# Patient Record
Sex: FEMALE | Race: ASIAN | NOT HISPANIC OR LATINO | Employment: OTHER | URBAN - METROPOLITAN AREA
[De-identification: names, ages, dates, MRNs, and addresses within clinical notes are randomized per-mention and may not be internally consistent; named-entity substitution may affect disease eponyms.]

---

## 2018-04-05 ENCOUNTER — OFFICE VISIT (OUTPATIENT)
Dept: INTERNAL MEDICINE | Facility: CLINIC | Age: 68
End: 2018-04-05

## 2018-04-05 VITALS
TEMPERATURE: 98 F | WEIGHT: 117 LBS | SYSTOLIC BLOOD PRESSURE: 126 MMHG | HEART RATE: 71 BPM | HEIGHT: 64 IN | BODY MASS INDEX: 19.97 KG/M2 | RESPIRATION RATE: 16 BRPM | OXYGEN SATURATION: 98 % | DIASTOLIC BLOOD PRESSURE: 84 MMHG

## 2018-04-05 DIAGNOSIS — E55.9 AVITAMINOSIS D: ICD-10-CM

## 2018-04-05 DIAGNOSIS — E78.2 MIXED HYPERLIPIDEMIA: ICD-10-CM

## 2018-04-05 DIAGNOSIS — K63.5 POLYP OF COLON, UNSPECIFIED PART OF COLON, UNSPECIFIED TYPE: ICD-10-CM

## 2018-04-05 DIAGNOSIS — Z12.31 ENCOUNTER FOR SCREENING MAMMOGRAM FOR MALIGNANT NEOPLASM OF BREAST: Primary | ICD-10-CM

## 2018-04-05 DIAGNOSIS — R17 SERUM TOTAL BILIRUBIN ELEVATED: ICD-10-CM

## 2018-04-05 PROCEDURE — 99214 OFFICE O/P EST MOD 30 MIN: CPT | Performed by: FAMILY MEDICINE

## 2018-04-05 NOTE — PROGRESS NOTES
Subjective   Kyaw Webb is a 67 y.o. female.     Chief Complaint   Patient presents with   • New patient         History of Present Illness   Delightful lady is any by her  she is a new patient with minimal medical history and on no medicines.  She had labs with borderline elevation of cholesterol and triglycerides also total bilirubin of 1.4.  There is a history of low vitamin D.  She has had a prior benign polyp colon polyp removed in 2014 I Dr. Bueno.  It looks like she would probably need repeat colonoscopy in 2019.  We discussed getting scheduled for mammograms.      The following portions of the patient's history were reviewed and updated as appropriate: allergies, current medications, past social history and problem list.    Review of Systems   Constitutional: Negative.    HENT: Negative.    Eyes: Negative.    Respiratory: Negative.    Cardiovascular: Negative.    Gastrointestinal: Negative.    Endocrine: Negative.    Genitourinary: Negative.    Skin: Negative.    Allergic/Immunologic: Negative.    Neurological: Negative.    Hematological: Negative.    Psychiatric/Behavioral: Negative.        Objective   Vitals:    04/05/18 1204   BP: 126/84   Pulse: 71   Resp: 16   Temp: 98 °F (36.7 °C)   SpO2: 98%     Physical Exam   Constitutional: She is oriented to person, place, and time. She appears well-developed and well-nourished.   HENT:   Head: Normocephalic.   Right Ear: External ear normal.   Left Ear: External ear normal.   Nose: Nose normal.   Mouth/Throat: Oropharynx is clear and moist.   Eyes: Conjunctivae and EOM are normal. Pupils are equal, round, and reactive to light.   Neck: Normal range of motion. Neck supple. No thyromegaly present.   Cardiovascular: Normal rate, regular rhythm and normal heart sounds.    Pulmonary/Chest: Effort normal and breath sounds normal.   Abdominal: Soft. Bowel sounds are normal.   Musculoskeletal: Normal range of motion.   Lymphadenopathy:     She has no  cervical adenopathy.   Neurological: She is alert and oriented to person, place, and time. She has normal reflexes.   Skin: Skin is warm and dry.   Psychiatric: She has a normal mood and affect. Her behavior is normal. Judgment and thought content normal.   Nursing note and vitals reviewed.      Assessment/Plan   Problem List Items Addressed This Visit        Digestive    Avitaminosis D    Relevant Orders    CBC & Differential    Comprehensive Metabolic Panel    Lipid Panel With / Chol / HDL Ratio    Urinalysis With / Microscopic If Indicated - Urine, Clean Catch      Other Visit Diagnoses     Encounter for screening mammogram for malignant neoplasm of breast    -  Primary    Relevant Orders    Mammo Screening Bilateral With CAD    Mixed hyperlipidemia        Relevant Orders    CBC & Differential    Comprehensive Metabolic Panel    Lipid Panel With / Chol / HDL Ratio    Urinalysis With / Microscopic If Indicated - Urine, Clean Catch    Polyp of colon, unspecified part of colon, unspecified type        Relevant Orders    CBC & Differential    Comprehensive Metabolic Panel    Lipid Panel With / Chol / HDL Ratio    Urinalysis With / Microscopic If Indicated - Urine, Clean Catch    Serum total bilirubin elevated        Relevant Orders    CBC & Differential    Comprehensive Metabolic Panel    Lipid Panel With / Chol / HDL Ratio    Urinalysis With / Microscopic If Indicated - Urine, Clean Catch      Patient is healthy.  We'll schedule mammograms and see her back in here for wellness exam show get screening labs today.

## 2018-04-06 LAB
ALBUMIN SERPL-MCNC: 4.9 G/DL (ref 3.5–5.2)
ALBUMIN/GLOB SERPL: 1.8 G/DL
ALP SERPL-CCNC: 93 U/L (ref 39–117)
ALT SERPL-CCNC: 26 U/L (ref 1–33)
APPEARANCE UR: CLEAR
AST SERPL-CCNC: 30 U/L (ref 1–32)
BACTERIA #/AREA URNS HPF: ABNORMAL /HPF
BASOPHILS # BLD AUTO: 0.02 10*3/MM3 (ref 0–0.2)
BASOPHILS NFR BLD AUTO: 0.4 % (ref 0–1.5)
BILIRUB SERPL-MCNC: 1.9 MG/DL (ref 0.1–1.2)
BILIRUB UR QL STRIP: NEGATIVE
BUN SERPL-MCNC: 13 MG/DL (ref 8–23)
BUN/CREAT SERPL: 15.7 (ref 7–25)
CALCIUM SERPL-MCNC: 10 MG/DL (ref 8.6–10.5)
CHLORIDE SERPL-SCNC: 101 MMOL/L (ref 98–107)
CHOLEST SERPL-MCNC: 250 MG/DL (ref 0–200)
CHOLEST/HDLC SERPL: 3.62 {RATIO}
CO2 SERPL-SCNC: 28.2 MMOL/L (ref 22–29)
COLOR UR: YELLOW
CREAT SERPL-MCNC: 0.83 MG/DL (ref 0.57–1)
EOSINOPHIL # BLD AUTO: 0.03 10*3/MM3 (ref 0–0.7)
EOSINOPHIL NFR BLD AUTO: 0.6 % (ref 0.3–6.2)
EPI CELLS #/AREA URNS HPF: ABNORMAL /HPF
ERYTHROCYTE [DISTWIDTH] IN BLOOD BY AUTOMATED COUNT: 12.2 % (ref 11.7–13)
GFR SERPLBLD CREATININE-BSD FMLA CKD-EPI: 68 ML/MIN/1.73
GFR SERPLBLD CREATININE-BSD FMLA CKD-EPI: 83 ML/MIN/1.73
GLOBULIN SER CALC-MCNC: 2.8 GM/DL
GLUCOSE SERPL-MCNC: 93 MG/DL (ref 65–99)
GLUCOSE UR QL: NEGATIVE
HCT VFR BLD AUTO: 42.8 % (ref 35.6–45.5)
HDLC SERPL-MCNC: 69 MG/DL (ref 40–60)
HGB BLD-MCNC: 13.8 G/DL (ref 11.9–15.5)
HGB UR QL STRIP: ABNORMAL
IMM GRANULOCYTES # BLD: 0 10*3/MM3 (ref 0–0.03)
IMM GRANULOCYTES NFR BLD: 0 % (ref 0–0.5)
KETONES UR QL STRIP: NEGATIVE
LDLC SERPL CALC-MCNC: 148 MG/DL (ref 0–100)
LEUKOCYTE ESTERASE UR QL STRIP: NEGATIVE
LYMPHOCYTES # BLD AUTO: 1.35 10*3/MM3 (ref 0.9–4.8)
LYMPHOCYTES NFR BLD AUTO: 26.9 % (ref 19.6–45.3)
MCH RBC QN AUTO: 31.7 PG (ref 26.9–32)
MCHC RBC AUTO-ENTMCNC: 32.2 G/DL (ref 32.4–36.3)
MCV RBC AUTO: 98.2 FL (ref 80.5–98.2)
MONOCYTES # BLD AUTO: 0.25 10*3/MM3 (ref 0.2–1.2)
MONOCYTES NFR BLD AUTO: 5 % (ref 5–12)
NEUTROPHILS # BLD AUTO: 3.37 10*3/MM3 (ref 1.9–8.1)
NEUTROPHILS NFR BLD AUTO: 67.1 % (ref 42.7–76)
NITRITE UR QL STRIP: NEGATIVE
PH UR STRIP: 6.5 [PH] (ref 5–8)
PLATELET # BLD AUTO: 180 10*3/MM3 (ref 140–500)
POTASSIUM SERPL-SCNC: 4.6 MMOL/L (ref 3.5–5.2)
PROT SERPL-MCNC: 7.7 G/DL (ref 6–8.5)
PROT UR QL STRIP: NEGATIVE
RBC # BLD AUTO: 4.36 10*6/MM3 (ref 3.9–5.2)
RBC #/AREA URNS HPF: ABNORMAL /HPF
SODIUM SERPL-SCNC: 143 MMOL/L (ref 136–145)
SP GR UR: ABNORMAL (ref 1–1.03)
TRIGL SERPL-MCNC: 164 MG/DL (ref 0–150)
UROBILINOGEN UR STRIP-MCNC: ABNORMAL MG/DL
VLDLC SERPL CALC-MCNC: 32.8 MG/DL (ref 5–40)
WBC # BLD AUTO: 5.02 10*3/MM3 (ref 4.5–10.7)
WBC #/AREA URNS HPF: ABNORMAL /HPF

## 2018-04-19 ENCOUNTER — HOSPITAL ENCOUNTER (OUTPATIENT)
Dept: MAMMOGRAPHY | Facility: HOSPITAL | Age: 68
Discharge: HOME | End: 2018-04-19
Admitting: FAMILY MEDICINE

## 2018-04-19 PROCEDURE — 77067 SCR MAMMO BI INCL CAD: CPT

## 2019-04-11 ENCOUNTER — OFFICE VISIT (OUTPATIENT)
Dept: INTERNAL MEDICINE | Facility: CLINIC | Age: 69
End: 2019-04-11

## 2019-04-11 VITALS
HEART RATE: 77 BPM | DIASTOLIC BLOOD PRESSURE: 78 MMHG | TEMPERATURE: 97 F | BODY MASS INDEX: 19.74 KG/M2 | WEIGHT: 115 LBS | SYSTOLIC BLOOD PRESSURE: 114 MMHG | OXYGEN SATURATION: 98 %

## 2019-04-11 DIAGNOSIS — R01.1 SYSTOLIC MURMUR: Primary | ICD-10-CM

## 2019-04-11 DIAGNOSIS — E78.2 MIXED HYPERLIPIDEMIA: ICD-10-CM

## 2019-04-11 DIAGNOSIS — E55.9 AVITAMINOSIS D: ICD-10-CM

## 2019-04-11 PROCEDURE — 99214 OFFICE O/P EST MOD 30 MIN: CPT | Performed by: FAMILY MEDICINE

## 2019-04-11 PROCEDURE — G0439 PPPS, SUBSEQ VISIT: HCPCS | Performed by: FAMILY MEDICINE

## 2019-04-11 PROCEDURE — 93000 ELECTROCARDIOGRAM COMPLETE: CPT | Performed by: FAMILY MEDICINE

## 2019-04-11 NOTE — PROGRESS NOTES
Procedure     ECG 12 Lead  Date/Time: 4/11/2019 11:25 AM  Performed by: Jhoan Burnett Jr., MD  Authorized by: Jhoan Burnett Jr., MD   Comparison: not compared with previous ECG   Previous ECG: no previous ECG available  Rhythm: sinus rhythm  Rhythm comments: Sinus arrhythmia  Rate: normal  Conduction: conduction normal  ST Segments: ST segments normal  T Waves: T waves normal  QRS axis: normal  Other: no other findings    Clinical impression: normal ECG

## 2019-04-11 NOTE — PATIENT INSTRUCTIONS
Medicare Wellness  Personal Prevention Plan of Service     Date of Office Visit:  2019  Encounter Provider:  Jhoan Burnett Jr., MD  Place of Service:  [facility] INTERNAL MEDICINE  Patient Name: Kyaw Webb  :  1950    As part of the Medicare Wellness portion of your visit today, we are providing you with this personalized preventive plan of services (PPPS). This plan is based upon recommendations of the [place] Preventive Services Task Force (USPSTF) and the Advisory Committee on Immunization Practices (ACIP).    This lists the preventive care services that should be considered, and provides dates of when you are due. Items listed as completed are up-to-date and do not require any further intervention.    Health Maintenance   Topic Date Due   • TDAP/TD VACCINES (1 - Tdap) 10/13/1968   • ZOSTER VACCINE (1 of 2) 10/13/1999   • HEPATITIS C SCREENING  2016   • MEDICARE ANNUAL WELLNESS  2016   • LIPID PANEL  2019   • PNEUMOCOCCAL VACCINES (65+ LOW/MEDIUM RISK) (1 of 2 - PCV13) 2020 (Originally 10/13/2014)   • INFLUENZA VACCINE  2019   • MAMMOGRAM  2020   • COLONOSCOPY  2024       No orders of the defined types were placed in this encounter.      No Follow-up on file.

## 2019-04-11 NOTE — PROGRESS NOTES
Subsequent Medicare Wellness Visit   The ABC's of the Annual Wellness Visit    No chief complaint on file.      HPI:  Kyaw Webb, -1950, is a 68 y.o. female who presents for a Subsequent Medicare Wellness Visit.    Recent Hospitalizations:  No hospitalization(s) within the last year..    Current Medical Providers:  Patient Care Team:  Jhoan Burnett Jr., MD as PCP - General (Family Medicine)  Zay Roque MD PhD as PCP - Claims Attributed    Health Habits and Functional and Cognitive Screening and Depression Screening:  Functional & Cognitive Status 2019   Do you have difficulty preparing food and eating? No   Do you have difficulty bathing yourself, getting dressed or grooming yourself? No   Do you have difficulty using the toilet? No   Do you have difficulty moving around from place to place? No   Do you have trouble with steps or getting out of a bed or a chair? No   In the past year have you fallen or experienced a near fall? No   Current Diet Well Balanced Diet   Dental Exam Up to date   Eye Exam Up to date   Exercise (times per week) 5 times per week   Current Exercise Activities Include Walking   Do you need help using the phone?  No   Are you deaf or do you have serious difficulty hearing?  No   Do you need help with transportation? No   Do you need help shopping? No   Do you need help preparing meals?  No   Do you need help with housework?  No   Do you need help with laundry? No   Do you need help taking your medications? No   Do you need help managing money? No   Do you ever drive or ride in a car without wearing a seat belt? No   Have you felt unusual stress, anger or loneliness in the last month? No   Who do you live with? Spouse   If you need help, do you have trouble finding someone available to you? No   Have you been bothered in the last four weeks by sexual problems? No   Do you have difficulty concentrating, remembering or making decisions? No       Compared to one  year ago, the patient feels her physical health is the same and her mental health is the same.    Depression Screen:  PHQ-2/PHQ-9 Depression Screening 4/11/2019   Little interest or pleasure in doing things 0   Feeling down, depressed, or hopeless 0   Trouble falling or staying asleep, or sleeping too much 0   Feeling tired or having little energy 0   Poor appetite or overeating 0   Feeling bad about yourself - or that you are a failure or have let yourself or your family down 0   Trouble concentrating on things, such as reading the newspaper or watching television 0   Moving or speaking so slowly that other people could have noticed. Or the opposite - being so fidgety or restless that you have been moving around a lot more than usual 0   Thoughts that you would be better off dead, or of hurting yourself in some way 0   Total Score 0   If you checked off any problems, how difficult have these problems made it for you to do your work, take care of things at home, or get along with other people? Not difficult at all         Past Medical/Family/Social History:  The following portions of the patient's history were reviewed and updated as appropriate: allergies, current medications, past family history, past medical history, past social history, past surgical history and problem list.    Allergies   Allergen Reactions   • Amoxicillin        No current outpatient medications on file.    Aspirin use counseling: Does not need ASA (and currently is not on it)    Current medication list contains no high risk medications.  No harmful drug interactions have been identified.     Family History   Family history unknown: Yes       Social History     Tobacco Use   • Smoking status: Never Smoker   Substance Use Topics   • Alcohol use: No       Past Surgical History:   Procedure Laterality Date   • HYSTERECTOMY     • OOPHORECTOMY         Patient Active Problem List   Diagnosis   • Avitaminosis D       Review of Systems    Objective      Vitals:    04/11/19 1032   BP: 114/78   BP Location: Left arm   Patient Position: Sitting   Cuff Size: Adult   Pulse: 77   Temp: 97 °F (36.1 °C)   TempSrc: Tympanic   SpO2: 98%   Weight: 52.2 kg (115 lb)   PainSc: 0-No pain       Patient's Body mass index is 19.74 kg/m². BMI is within normal parameters. No follow-up required..      No exam data present    The patient has no evidence of cognitve impairment.     Physical Exam    Recent Lab Results:  Lab Results   Component Value Date    GLU 93 04/05/2018     Lab Results   Component Value Date    TRIG 164 (H) 04/05/2018    HDL 69 (H) 04/05/2018    VLDL 32.8 04/05/2018       Assessment/Plan   Age-appropriate Screening Schedule:  Refer to the list below for future screening recommendations based on patient's age, sex and/or medical conditions.      Health Maintenance   Topic Date Due   • TDAP/TD VACCINES (1 - Tdap) 10/13/1968   • ZOSTER VACCINE (1 of 2) 10/13/1999   • LIPID PANEL  04/05/2019   • PNEUMOCOCCAL VACCINES (65+ LOW/MEDIUM RISK) (1 of 2 - PCV13) 04/09/2020 (Originally 10/13/2014)   • INFLUENZA VACCINE  08/01/2019   • MAMMOGRAM  04/19/2020   • COLONOSCOPY  09/11/2024       Medicare Risks and Personalized Health Plan:  Cardiovascular risk;  Patient advised to follow heart healthy diet to help decrease cardiovascular risk       CMS-Preventive Services Quick Reference  Medicare Preventive Services Addressed:  Annual Wellness Visit (AWV)    Advance Care Planning:  Patient does not have an advance directive - not interested in additional information    There are no diagnoses linked to this encounter.    An After Visit Summary and PPPS with all of these plans were given to the patient.      Follow Up:  No Follow-up on file.

## 2019-04-11 NOTE — PROGRESS NOTES
Robb Webb is a 68 y.o. female.     Chief Complaint   Patient presents with   • Hyperlipidemia   • Annual Exam         History of Present Illness   Delightful lady who is asymptomatic with a history of some degree of hyperlipidemia last year.  She would desires to skip mammogram every other year.  Otherwise on examination she is found to have an early systolic murmur that I did not hear last year.  EKG shows normal sinus rhythm sinus arrhythmia.  We will set her up for an echocardiogram and also follow-up with Dr. Berman cardiology.      The following portions of the patient's history were reviewed and updated as appropriate: allergies, current medications, past social history and problem list.    Review of Systems   Constitutional: Negative.    HENT: Negative.    Eyes: Negative.    Respiratory: Negative.    Cardiovascular: Negative.    Gastrointestinal: Negative.    Endocrine: Negative.    Genitourinary: Negative.    Musculoskeletal: Negative.    Skin: Negative.    Allergic/Immunologic: Negative.    Neurological: Negative.    Hematological: Negative.    Psychiatric/Behavioral: Negative.        Objective   Vitals:    04/11/19 1032   BP: 114/78   Pulse: 77   Temp: 97 °F (36.1 °C)   SpO2: 98%     Physical Exam   Constitutional: She is oriented to person, place, and time. She appears well-developed and well-nourished.   HENT:   Head: Normocephalic and atraumatic.   Right Ear: Tympanic membrane and external ear normal.   Left Ear: Tympanic membrane and external ear normal.   Nose: Nose normal.   Mouth/Throat: Oropharynx is clear and moist.   Eyes: Conjunctivae and EOM are normal. Pupils are equal, round, and reactive to light.   Neck: Normal range of motion. Neck supple. No JVD present. No thyromegaly present.   Cardiovascular: Normal rate, regular rhythm and intact distal pulses. Frequent extrasystoles are present.   Murmur heard.   Systolic murmur is present with a grade of  2/6.  Pulmonary/Chest: Effort normal and breath sounds normal.   Abdominal: Soft. Bowel sounds are normal.   Musculoskeletal: Normal range of motion.   Lymphadenopathy:     She has no cervical adenopathy.   Neurological: She is alert and oriented to person, place, and time. No cranial nerve deficit. Coordination normal.   Skin: Skin is warm and dry. No rash noted.   Psychiatric: She has a normal mood and affect. Her behavior is normal. Judgment and thought content normal.   Vitals reviewed.      Assessment/Plan   Problem List Items Addressed This Visit        Digestive    Avitaminosis D    Relevant Orders    CBC & Differential    Comprehensive Metabolic Panel    Lipid Panel With / Chol / HDL Ratio    TSH    T4, Free    Urinalysis With Microscopic If Indicated (No Culture) - Urine, Clean Catch    Vitamin D 25 Hydroxy      Other Visit Diagnoses     Systolic murmur    -  Primary    Relevant Orders    Adult Transthoracic Echo Complete W/ Cont if Necessary Per Protocol    CBC & Differential    Comprehensive Metabolic Panel    Lipid Panel With / Chol / HDL Ratio    TSH    T4, Free    Urinalysis With Microscopic If Indicated (No Culture) - Urine, Clean Catch    Vitamin D 25 Hydroxy    ECG 12 Lead    Ambulatory Referral to Cardiology    Mixed hyperlipidemia        Relevant Orders    CBC & Differential    Comprehensive Metabolic Panel    Lipid Panel With / Chol / HDL Ratio    TSH    T4, Free    Urinalysis With Microscopic If Indicated (No Culture) - Urine, Clean Catch    Vitamin D 25 Hydroxy      Plan: Echocardiogram labs pending follow-up with cardiology recheck in a year or sooner if needed.  EKG is reviewed with patient and her .

## 2019-04-12 LAB
25(OH)D3+25(OH)D2 SERPL-MCNC: 26.5 NG/ML (ref 30–100)
ALBUMIN SERPL-MCNC: 4.7 G/DL (ref 3.5–5.2)
ALBUMIN/GLOB SERPL: 1.7 G/DL
ALP SERPL-CCNC: 90 U/L (ref 39–117)
ALT SERPL-CCNC: 26 U/L (ref 1–33)
APPEARANCE UR: CLEAR
AST SERPL-CCNC: 32 U/L (ref 1–32)
BASOPHILS # BLD AUTO: 0.02 10*3/MM3 (ref 0–0.2)
BASOPHILS NFR BLD AUTO: 0.4 % (ref 0–1.5)
BILIRUB SERPL-MCNC: 1.6 MG/DL (ref 0.2–1.2)
BILIRUB UR QL STRIP: NEGATIVE
BUN SERPL-MCNC: 10 MG/DL (ref 8–23)
BUN/CREAT SERPL: 10.9 (ref 7–25)
CALCIUM SERPL-MCNC: 8.6 MG/DL (ref 8.6–10.5)
CHLORIDE SERPL-SCNC: 101 MMOL/L (ref 98–107)
CHOLEST SERPL-MCNC: 208 MG/DL (ref 0–200)
CHOLEST/HDLC SERPL: 3.78 {RATIO}
CO2 SERPL-SCNC: 26.6 MMOL/L (ref 22–29)
COLOR UR: YELLOW
CREAT SERPL-MCNC: 0.92 MG/DL (ref 0.57–1)
EOSINOPHIL # BLD AUTO: 0.05 10*3/MM3 (ref 0–0.4)
EOSINOPHIL NFR BLD AUTO: 1.1 % (ref 0.3–6.2)
ERYTHROCYTE [DISTWIDTH] IN BLOOD BY AUTOMATED COUNT: 11.9 % (ref 12.3–15.4)
GLOBULIN SER CALC-MCNC: 2.7 GM/DL
GLUCOSE SERPL-MCNC: 96 MG/DL (ref 65–99)
GLUCOSE UR QL: NEGATIVE
HCT VFR BLD AUTO: 41.7 % (ref 34–46.6)
HDLC SERPL-MCNC: 55 MG/DL (ref 40–60)
HGB BLD-MCNC: 13.4 G/DL (ref 12–15.9)
HGB UR QL STRIP: NEGATIVE
IMM GRANULOCYTES # BLD AUTO: 0.02 10*3/MM3 (ref 0–0.05)
IMM GRANULOCYTES NFR BLD AUTO: 0.4 % (ref 0–0.5)
KETONES UR QL STRIP: NEGATIVE
LDLC SERPL CALC-MCNC: 113 MG/DL (ref 0–100)
LEUKOCYTE ESTERASE UR QL STRIP: NEGATIVE
LYMPHOCYTES # BLD AUTO: 1.67 10*3/MM3 (ref 0.7–3.1)
LYMPHOCYTES NFR BLD AUTO: 36.9 % (ref 19.6–45.3)
MCH RBC QN AUTO: 31.6 PG (ref 26.6–33)
MCHC RBC AUTO-ENTMCNC: 32.1 G/DL (ref 31.5–35.7)
MCV RBC AUTO: 98.3 FL (ref 79–97)
MONOCYTES # BLD AUTO: 0.23 10*3/MM3 (ref 0.1–0.9)
MONOCYTES NFR BLD AUTO: 5.1 % (ref 5–12)
NEUTROPHILS # BLD AUTO: 2.53 10*3/MM3 (ref 1.4–7)
NEUTROPHILS NFR BLD AUTO: 56.1 % (ref 42.7–76)
NITRITE UR QL STRIP: NEGATIVE
NRBC BLD AUTO-RTO: 0 /100 WBC (ref 0–0)
PH UR STRIP: 6.5 [PH] (ref 5–8)
PLATELET # BLD AUTO: 171 10*3/MM3 (ref 140–450)
POTASSIUM SERPL-SCNC: 3.5 MMOL/L (ref 3.5–5.2)
PROT SERPL-MCNC: 7.4 G/DL (ref 6–8.5)
PROT UR QL STRIP: NEGATIVE
RBC # BLD AUTO: 4.24 10*6/MM3 (ref 3.77–5.28)
SODIUM SERPL-SCNC: 140 MMOL/L (ref 136–145)
SP GR UR: <1.005 (ref 1–1.03)
T4 FREE SERPL-MCNC: 1.22 NG/DL (ref 0.93–1.7)
TRIGL SERPL-MCNC: 202 MG/DL (ref 0–150)
TSH SERPL DL<=0.005 MIU/L-ACNC: 1.6 MIU/ML (ref 0.27–4.2)
UROBILINOGEN UR STRIP-MCNC: ABNORMAL MG/DL
VLDLC SERPL CALC-MCNC: 40.4 MG/DL
WBC # BLD AUTO: 4.52 10*3/MM3 (ref 3.4–10.8)

## 2019-06-05 ENCOUNTER — OFFICE VISIT (OUTPATIENT)
Dept: CARDIOLOGY | Facility: CLINIC | Age: 69
End: 2019-06-05

## 2019-06-05 ENCOUNTER — HOSPITAL ENCOUNTER (OUTPATIENT)
Dept: CARDIOLOGY | Facility: HOSPITAL | Age: 69
Discharge: HOME | End: 2019-06-05
Admitting: FAMILY MEDICINE

## 2019-06-05 VITALS
RESPIRATION RATE: 16 BRPM | HEART RATE: 70 BPM | HEIGHT: 64 IN | OXYGEN SATURATION: 98 % | WEIGHT: 118 LBS | SYSTOLIC BLOOD PRESSURE: 138 MMHG | BODY MASS INDEX: 20.14 KG/M2 | DIASTOLIC BLOOD PRESSURE: 80 MMHG

## 2019-06-05 VITALS
HEIGHT: 64 IN | BODY MASS INDEX: 19.63 KG/M2 | DIASTOLIC BLOOD PRESSURE: 80 MMHG | WEIGHT: 115 LBS | SYSTOLIC BLOOD PRESSURE: 128 MMHG

## 2019-06-05 DIAGNOSIS — R01.1 SYSTOLIC MURMUR: ICD-10-CM

## 2019-06-05 DIAGNOSIS — R01.1 CARDIAC MURMUR: Primary | ICD-10-CM

## 2019-06-05 LAB
AORTIC ROOT ANNULUS: 1.7 CM
ASCENDING AORTA: 2.9 CM
BH CV ECHO MEAS - ACS: 1.9 CM
BH CV ECHO MEAS - AI DEC SLOPE: 202.6 CM/SEC^2
BH CV ECHO MEAS - AI MAX PG: 79.3 MMHG
BH CV ECHO MEAS - AI MAX VEL: 445.2 CM/SEC
BH CV ECHO MEAS - AI P1/2T: 643.6 MSEC
BH CV ECHO MEAS - AO MAX PG (FULL): 10.6 MMHG
BH CV ECHO MEAS - AO MAX PG: 20.8 MMHG
BH CV ECHO MEAS - AO MEAN PG (FULL): 5.3 MMHG
BH CV ECHO MEAS - AO MEAN PG: 10 MMHG
BH CV ECHO MEAS - AO V2 MAX: 228 CM/SEC
BH CV ECHO MEAS - AO V2 MEAN: 144.1 CM/SEC
BH CV ECHO MEAS - AO V2 VTI: 54.3 CM
BH CV ECHO MEAS - ASC AORTA: 2.9 CM
BH CV ECHO MEAS - AVA(I,A): 1.3 CM^2
BH CV ECHO MEAS - AVA(I,D): 1.3 CM^2
BH CV ECHO MEAS - AVA(V,A): 1.5 CM^2
BH CV ECHO MEAS - AVA(V,D): 1.5 CM^2
BH CV ECHO MEAS - BSA(HAYCOCK): 1.5 M^2
BH CV ECHO MEAS - BSA: 1.5 M^2
BH CV ECHO MEAS - BZI_BMI: 19.7 KILOGRAMS/M^2
BH CV ECHO MEAS - BZI_METRIC_HEIGHT: 162.6 CM
BH CV ECHO MEAS - BZI_METRIC_WEIGHT: 52.2 KG
BH CV ECHO MEAS - EDV(MOD-SP2): 63 ML
BH CV ECHO MEAS - EDV(MOD-SP4): 65 ML
BH CV ECHO MEAS - EDV(TEICH): 110.4 ML
BH CV ECHO MEAS - EF(CUBED): 83.3 %
BH CV ECHO MEAS - EF(MOD-BP): 69 %
BH CV ECHO MEAS - EF(MOD-SP2): 68.3 %
BH CV ECHO MEAS - EF(MOD-SP4): 67.7 %
BH CV ECHO MEAS - EF(TEICH): 76.1 %
BH CV ECHO MEAS - ESV(MOD-SP2): 20 ML
BH CV ECHO MEAS - ESV(MOD-SP4): 21 ML
BH CV ECHO MEAS - ESV(TEICH): 26.4 ML
BH CV ECHO MEAS - IVS/LVPW: 0.92
BH CV ECHO MEAS - IVSD: 0.74 CM
BH CV ECHO MEAS - LAT PEAK E' VEL: 11 CM/SEC
BH CV ECHO MEAS - LV DIASTOLIC VOL/BSA (35-75): 42 ML/M^2
BH CV ECHO MEAS - LV MASS(C)D: 122.6 GRAMS
BH CV ECHO MEAS - LV MASS(C)DI: 79.3 GRAMS/M^2
BH CV ECHO MEAS - LV MAX PG: 10.2 MMHG
BH CV ECHO MEAS - LV MEAN PG: 4.8 MMHG
BH CV ECHO MEAS - LV SYSTOLIC VOL/BSA (12-30): 13.6 ML/M^2
BH CV ECHO MEAS - LV V1 MAX: 159.7 CM/SEC
BH CV ECHO MEAS - LV V1 MEAN: 95.6 CM/SEC
BH CV ECHO MEAS - LV V1 VTI: 31.6 CM
BH CV ECHO MEAS - LVIDD: 4.9 CM
BH CV ECHO MEAS - LVIDS: 2.7 CM
BH CV ECHO MEAS - LVLD AP2: 7 CM
BH CV ECHO MEAS - LVLD AP4: 7.1 CM
BH CV ECHO MEAS - LVLS AP2: 6.1 CM
BH CV ECHO MEAS - LVLS AP4: 6.2 CM
BH CV ECHO MEAS - LVOT AREA (M): 2.3 CM^2
BH CV ECHO MEAS - LVOT AREA: 2.2 CM^2
BH CV ECHO MEAS - LVOT DIAM: 1.7 CM
BH CV ECHO MEAS - LVPWD: 0.8 CM
BH CV ECHO MEAS - MED PEAK E' VEL: 10 CM/SEC
BH CV ECHO MEAS - MR MAX PG: 65.3 MMHG
BH CV ECHO MEAS - MR MAX VEL: 404 CM/SEC
BH CV ECHO MEAS - MV A DUR: 0.1 SEC
BH CV ECHO MEAS - MV A MAX VEL: 71.7 CM/SEC
BH CV ECHO MEAS - MV DEC SLOPE: 568.4 CM/SEC^2
BH CV ECHO MEAS - MV DEC TIME: 0.17 SEC
BH CV ECHO MEAS - MV E MAX VEL: 87.5 CM/SEC
BH CV ECHO MEAS - MV E/A: 1.2
BH CV ECHO MEAS - MV MAX PG: 6.8 MMHG
BH CV ECHO MEAS - MV MEAN PG: 2.7 MMHG
BH CV ECHO MEAS - MV P1/2T MAX VEL: 87.5 CM/SEC
BH CV ECHO MEAS - MV P1/2T: 45.1 MSEC
BH CV ECHO MEAS - MV V2 MAX: 130.3 CM/SEC
BH CV ECHO MEAS - MV V2 MEAN: 75.7 CM/SEC
BH CV ECHO MEAS - MV V2 VTI: 33.9 CM
BH CV ECHO MEAS - MVA P1/2T LCG: 2.5 CM^2
BH CV ECHO MEAS - MVA(P1/2T): 4.9 CM^2
BH CV ECHO MEAS - MVA(VTI): 2 CM^2
BH CV ECHO MEAS - PA ACC TIME: 0.12 SEC
BH CV ECHO MEAS - PA MAX PG (FULL): 2.7 MMHG
BH CV ECHO MEAS - PA MAX PG: 6.4 MMHG
BH CV ECHO MEAS - PA PR(ACCEL): 23.5 MMHG
BH CV ECHO MEAS - PA V2 MAX: 126.2 CM/SEC
BH CV ECHO MEAS - PULM A REVS DUR: 0.12 SEC
BH CV ECHO MEAS - PULM A REVS VEL: 30.1 CM/SEC
BH CV ECHO MEAS - PULM DIAS VEL: 39.9 CM/SEC
BH CV ECHO MEAS - PULM S/D: 1.6
BH CV ECHO MEAS - PULM SYS VEL: 64.5 CM/SEC
BH CV ECHO MEAS - PVA(V,A): 1.2 CM^2
BH CV ECHO MEAS - PVA(V,D): 1.2 CM^2
BH CV ECHO MEAS - QP/QS: 0.39
BH CV ECHO MEAS - RAP SYSTOLE: 3 MMHG
BH CV ECHO MEAS - RV MAX PG: 3.7 MMHG
BH CV ECHO MEAS - RV MEAN PG: 2.1 MMHG
BH CV ECHO MEAS - RV V1 MAX: 95.7 CM/SEC
BH CV ECHO MEAS - RV V1 MEAN: 63.3 CM/SEC
BH CV ECHO MEAS - RV V1 VTI: 17.6 CM
BH CV ECHO MEAS - RVOT AREA: 1.5 CM^2
BH CV ECHO MEAS - RVOT DIAM: 1.4 CM
BH CV ECHO MEAS - RVSP: 32 MMHG
BH CV ECHO MEAS - SI(CUBED): 61.7 ML/M^2
BH CV ECHO MEAS - SI(LVOT): 44.2 ML/M^2
BH CV ECHO MEAS - SI(MOD-SP2): 27.8 ML/M^2
BH CV ECHO MEAS - SI(MOD-SP4): 28.5 ML/M^2
BH CV ECHO MEAS - SI(TEICH): 54.4 ML/M^2
BH CV ECHO MEAS - SUP REN AO DIAM: 1.8 CM
BH CV ECHO MEAS - SV(CUBED): 95.3 ML
BH CV ECHO MEAS - SV(LVOT): 68.3 ML
BH CV ECHO MEAS - SV(MOD-SP2): 43 ML
BH CV ECHO MEAS - SV(MOD-SP4): 44 ML
BH CV ECHO MEAS - SV(RVOT): 26.7 ML
BH CV ECHO MEAS - SV(TEICH): 84.1 ML
BH CV ECHO MEAS - TAPSE (>1.6): 2.8 CM2
BH CV ECHO MEAS - TR MAX VEL: 269.3 CM/SEC
BH CV ECHO MEASUREMENTS AVERAGE E/E' RATIO: 8.33
BH CV XLRA - RV BASE: 2.7 CM
BH CV XLRA - TDI S': 16 CM/SEC
LEFT ATRIUM VOLUME INDEX: 15 ML/M2
LV EF 2D ECHO EST: 69 %
SINUS: 2.8 CM
STJ: 2.5 CM

## 2019-06-05 PROCEDURE — 93306 TTE W/DOPPLER COMPLETE: CPT

## 2019-06-05 PROCEDURE — 93306 TTE W/DOPPLER COMPLETE: CPT | Performed by: INTERNAL MEDICINE

## 2019-06-05 PROCEDURE — 99203 OFFICE O/P NEW LOW 30 MIN: CPT | Performed by: INTERNAL MEDICINE

## 2019-06-05 NOTE — PROGRESS NOTES
Date of Office Visit: 2019  Encounter Provider: Enrrique Subramanian MD  Place of Service: [facility] CARDIOLOGY  Patient Name: Kyaw Webb  :1950  Jhoan Burnett Jr., MD    Cardiac murmur    History of Present Illness    The patient is a 69-year-old Chinese female.  She normally follows with Dr. burnett.  He referred her here today for evaluation of a cardiac murmur.  The murmur was recently discovered on a routine physical examination.  The patient has no knowledge of any previous cardiac murmurs.  She denies any history of rheumatic fever.  In fact she has very limited cardiac history at all.  She is completely asymptomatic.  She denies any chest pain, shortness of breath, lightheadedness, dizziness, orthopnea or paroxysmal nocturnal dyspnea.  Past Medical History:   Diagnosis Date   • Avitaminosis D    • Hyperlipidemia    • Systolic murmur          Past Surgical History:   Procedure Laterality Date   • HYSTERECTOMY     • OOPHORECTOMY           No current outpatient medications on file.      Social History     Socioeconomic History   • Marital status:      Spouse name: Not on file   • Number of children: Not on file   • Years of education: Not on file   • Highest education level: Not on file   Occupational History   • Occupation: homemaker   Tobacco Use   • Smoking status: Never Smoker   • Smokeless tobacco: Never Used   Substance and Sexual Activity   • Alcohol use: No   • Drug use: No         Review of Systems   Constitution: Negative.   HENT: Negative.    Eyes: Negative.    Cardiovascular: Negative.    Respiratory: Negative.    Endocrine: Negative.    Skin: Negative.    Musculoskeletal: Negative.    Gastrointestinal: Negative.    Neurological: Negative.    Psychiatric/Behavioral: Negative.        Procedures    Procedures      Electrocardiogram was reviewed that was done in April.  No abnormalities noted  Objective:    /80 (BP Location: Right arm,  "Patient Position: Sitting, Cuff Size: Adult)   Pulse 70   Resp 16   Ht 162.6 cm (64\")   Wt 53.5 kg (118 lb)   SpO2 98%   BMI 20.25 kg/m²         Physical Exam   Constitutional: She is oriented to person, place, and time. She appears well-developed and well-nourished.   HENT:   Head: Normocephalic.   Eyes: Pupils are equal, round, and reactive to light.   Neck: Normal range of motion. No JVD present. Carotid bruit is not present. No thyromegaly present.   Cardiovascular: Normal rate, regular rhythm, S1 normal, S2 normal and intact distal pulses. Exam reveals no gallop and no friction rub.   Murmur heard.   Harsh midsystolic murmur is present with a grade of 2/6 at the upper right sternal border radiating to the neck.  Pulses:       Carotid pulses are 2+ on the right side, and 2+ on the left side.       Dorsalis pedis pulses are 2+ on the right side, and 2+ on the left side.        Posterior tibial pulses are 2+ on the right side, and 2+ on the left side.   Pulmonary/Chest: Effort normal and breath sounds normal.   Abdominal: Soft. Bowel sounds are normal.   Musculoskeletal: She exhibits no edema.   Neurological: She is alert and oriented to person, place, and time.   Skin: Skin is warm, dry and intact. No erythema.   Psychiatric: She has a normal mood and affect.   Vitals reviewed.          Assessment:       Diagnosis Plan   1. Cardiac murmur       I suspect the patient has aortic valve sclerosis.  She had an echocardiogram done here today that I  reviewed briefly.  I did not see any significant abnormalities other than some calcification around the aortic valve.  The overall left ventricular systolic function is normal.  I do not believe she has any stenosis.    This is generally a benign finding.  There is nothing really to do.  There is no routine follow-up recommended.  I reviewed symptoms with her and her  should she start to develop them.    I appreciate the opportunity to see her in " consultation.     Plan:

## 2020-08-05 ENCOUNTER — TELEPHONE (OUTPATIENT)
Dept: INTERNAL MEDICINE | Facility: CLINIC | Age: 70
End: 2020-08-05

## 2020-10-21 ENCOUNTER — OFFICE VISIT (OUTPATIENT)
Dept: INTERNAL MEDICINE | Facility: CLINIC | Age: 70
End: 2020-10-21

## 2020-10-21 VITALS
HEIGHT: 64 IN | HEART RATE: 64 BPM | DIASTOLIC BLOOD PRESSURE: 72 MMHG | BODY MASS INDEX: 19.56 KG/M2 | OXYGEN SATURATION: 98 % | SYSTOLIC BLOOD PRESSURE: 120 MMHG | TEMPERATURE: 97.8 F | WEIGHT: 114.6 LBS

## 2020-10-21 DIAGNOSIS — E78.2 MIXED HYPERLIPIDEMIA: ICD-10-CM

## 2020-10-21 DIAGNOSIS — R01.1 SYSTOLIC MURMUR: ICD-10-CM

## 2020-10-21 DIAGNOSIS — Z12.31 ENCOUNTER FOR SCREENING MAMMOGRAM FOR BREAST CANCER: ICD-10-CM

## 2020-10-21 DIAGNOSIS — Z00.00 MEDICARE ANNUAL WELLNESS VISIT, SUBSEQUENT: Primary | ICD-10-CM

## 2020-10-21 DIAGNOSIS — E55.9 AVITAMINOSIS D: ICD-10-CM

## 2020-10-21 LAB
25(OH)D3+25(OH)D2 SERPL-MCNC: 38.3 NG/ML (ref 30–100)
ALBUMIN SERPL-MCNC: 4.5 G/DL (ref 3.5–5.2)
ALBUMIN/GLOB SERPL: 1.8 G/DL
ALP SERPL-CCNC: 96 U/L (ref 39–117)
ALT SERPL-CCNC: 19 U/L (ref 1–33)
APPEARANCE UR: CLEAR
AST SERPL-CCNC: 27 U/L (ref 1–32)
BACTERIA #/AREA URNS HPF: NORMAL /HPF
BASOPHILS # BLD AUTO: 0.03 10*3/MM3 (ref 0–0.2)
BASOPHILS NFR BLD AUTO: 0.7 % (ref 0–1.5)
BILIRUB SERPL-MCNC: 1.3 MG/DL (ref 0–1.2)
BILIRUB UR QL STRIP: NEGATIVE
BUN SERPL-MCNC: 14 MG/DL (ref 8–23)
BUN/CREAT SERPL: 17.5 (ref 7–25)
CALCIUM SERPL-MCNC: 9.3 MG/DL (ref 8.6–10.5)
CASTS URNS MICRO: NORMAL
CHLORIDE SERPL-SCNC: 102 MMOL/L (ref 98–107)
CHOLEST SERPL-MCNC: 233 MG/DL (ref 0–200)
CHOLEST/HDLC SERPL: 3.88 {RATIO}
CO2 SERPL-SCNC: 27.8 MMOL/L (ref 22–29)
COLOR UR: YELLOW
CREAT SERPL-MCNC: 0.8 MG/DL (ref 0.57–1)
EOSINOPHIL # BLD AUTO: 0.02 10*3/MM3 (ref 0–0.4)
EOSINOPHIL NFR BLD AUTO: 0.4 % (ref 0.3–6.2)
EPI CELLS #/AREA URNS HPF: NORMAL /HPF
ERYTHROCYTE [DISTWIDTH] IN BLOOD BY AUTOMATED COUNT: 12.1 % (ref 12.3–15.4)
GLOBULIN SER CALC-MCNC: 2.5 GM/DL
GLUCOSE SERPL-MCNC: 96 MG/DL (ref 65–99)
GLUCOSE UR QL: NEGATIVE
HCT VFR BLD AUTO: 43.3 % (ref 34–46.6)
HDLC SERPL-MCNC: 60 MG/DL (ref 40–60)
HGB BLD-MCNC: 14.2 G/DL (ref 12–15.9)
HGB UR QL STRIP: ABNORMAL
IMM GRANULOCYTES # BLD AUTO: 0.01 10*3/MM3 (ref 0–0.05)
IMM GRANULOCYTES NFR BLD AUTO: 0.2 % (ref 0–0.5)
KETONES UR QL STRIP: NEGATIVE
LDLC SERPL CALC-MCNC: 137 MG/DL (ref 0–100)
LEUKOCYTE ESTERASE UR QL STRIP: NEGATIVE
LYMPHOCYTES # BLD AUTO: 1.12 10*3/MM3 (ref 0.7–3.1)
LYMPHOCYTES NFR BLD AUTO: 25.1 % (ref 19.6–45.3)
MCH RBC QN AUTO: 31.7 PG (ref 26.6–33)
MCHC RBC AUTO-ENTMCNC: 32.8 G/DL (ref 31.5–35.7)
MCV RBC AUTO: 96.7 FL (ref 79–97)
MONOCYTES # BLD AUTO: 0.2 10*3/MM3 (ref 0.1–0.9)
MONOCYTES NFR BLD AUTO: 4.5 % (ref 5–12)
NEUTROPHILS # BLD AUTO: 3.08 10*3/MM3 (ref 1.7–7)
NEUTROPHILS NFR BLD AUTO: 69.1 % (ref 42.7–76)
NITRITE UR QL STRIP: NEGATIVE
NRBC BLD AUTO-RTO: 0 /100 WBC (ref 0–0.2)
PH UR STRIP: 6 [PH] (ref 5–8)
PLATELET # BLD AUTO: 195 10*3/MM3 (ref 140–450)
POTASSIUM SERPL-SCNC: 4 MMOL/L (ref 3.5–5.2)
PROT SERPL-MCNC: 7 G/DL (ref 6–8.5)
PROT UR QL STRIP: NEGATIVE
RBC # BLD AUTO: 4.48 10*6/MM3 (ref 3.77–5.28)
RBC #/AREA URNS HPF: NORMAL /HPF
SODIUM SERPL-SCNC: 138 MMOL/L (ref 136–145)
SP GR UR: 1.01 (ref 1–1.03)
T4 FREE SERPL-MCNC: 1.28 NG/DL (ref 0.93–1.7)
TRIGL SERPL-MCNC: 202 MG/DL (ref 0–150)
TSH SERPL DL<=0.005 MIU/L-ACNC: 1.56 UIU/ML (ref 0.27–4.2)
UROBILINOGEN UR STRIP-MCNC: ABNORMAL MG/DL
VIT B12 SERPL-MCNC: 365 PG/ML (ref 211–946)
VLDLC SERPL CALC-MCNC: 36 MG/DL (ref 5–40)
WBC # BLD AUTO: 4.46 10*3/MM3 (ref 3.4–10.8)
WBC #/AREA URNS HPF: NORMAL /HPF

## 2020-10-21 PROCEDURE — 99213 OFFICE O/P EST LOW 20 MIN: CPT | Performed by: FAMILY MEDICINE

## 2020-10-21 PROCEDURE — G0439 PPPS, SUBSEQ VISIT: HCPCS | Performed by: FAMILY MEDICINE

## 2020-10-21 NOTE — PATIENT INSTRUCTIONS
Medicare Wellness  Personal Prevention Plan of Service     Date of Office Visit:  10/21/2020  Encounter Provider:  Jhoan Burnett Jr., MD  Place of Service:  [facility] INTERNAL MEDICINE  Patient Name: Kyaw Webb  :  1950    As part of the Medicare Wellness portion of your visit today, we are providing you with this personalized preventive plan of services (PPPS). This plan is based upon recommendations of the [place] Preventive Services Task Force (USPSTF) and the Advisory Committee on Immunization Practices (ACIP).    This lists the preventive care services that should be considered, and provides dates of when you are due. Items listed as completed are up-to-date and do not require any further intervention.    Health Maintenance   Topic Date Due   • TDAP/TD VACCINES (1 - Tdap) 10/13/1968   • ZOSTER VACCINE (1 of 2) 10/13/1999   • Pneumococcal Vaccine 65+ (1 of 1 - PPSV23) 10/13/2014   • HEPATITIS C SCREENING  2016   • ANNUAL WELLNESS VISIT  2020   • LIPID PANEL  2020   • MAMMOGRAM  2020   • INFLUENZA VACCINE  2020   • COLONOSCOPY  2024       No orders of the defined types were placed in this encounter.      No follow-ups on file.

## 2020-10-21 NOTE — PROGRESS NOTES
The ABCs of the Annual Wellness Visit  Subsequent Medicare Wellness Visit    Chief Complaint   Patient presents with   • Medicare Wellness-subsequent       Subjective   History of Present Illness:  Kyaw Webb is a 70 y.o. female who presents for a Subsequent Medicare Wellness Visit.    HEALTH RISK ASSESSMENT    Recent Hospitalizations:  No hospitalization(s) within the last year.    Current Medical Providers:  Patient Care Team:  Jhona Burnett Jr., MD as PCP - General (Family Medicine)  Zay Roque MD PhD as PCP - Claims Attributed    Smoking Status:  Social History     Tobacco Use   Smoking Status Never Smoker   Smokeless Tobacco Never Used       Alcohol Consumption:  Social History     Substance and Sexual Activity   Alcohol Use No       Depression Screen:   PHQ-2/PHQ-9 Depression Screening 10/21/2020   Little interest or pleasure in doing things 0   Feeling down, depressed, or hopeless 0   Trouble falling or staying asleep, or sleeping too much 0   Feeling tired or having little energy 0   Poor appetite or overeating 0   Feeling bad about yourself - or that you are a failure or have let yourself or your family down 0   Trouble concentrating on things, such as reading the newspaper or watching television 0   Moving or speaking so slowly that other people could have noticed. Or the opposite - being so fidgety or restless that you have been moving around a lot more than usual 0   Thoughts that you would be better off dead, or of hurting yourself in some way 0   Total Score 0   If you checked off any problems, how difficult have these problems made it for you to do your work, take care of things at home, or get along with other people? Not difficult at all       Fall Risk Screen:  STEADI Fall Risk Assessment was completed, and patient is at LOW risk for falls.Assessment completed on:10/21/2020    Health Habits and Functional and Cognitive Screening:  Functional & Cognitive Status 10/21/2020   Do  you have difficulty preparing food and eating? No   Do you have difficulty bathing yourself, getting dressed or grooming yourself? No   Do you have difficulty using the toilet? No   Do you have difficulty moving around from place to place? No   Do you have trouble with steps or getting out of a bed or a chair? No   Current Diet Well Balanced Diet   Dental Exam Up to date   Eye Exam Up to date   Exercise (times per week) 7 times per week   Current Exercises Include Walking   Current Exercise Activities Include -   Do you need help using the phone?  No   Are you deaf or do you have serious difficulty hearing?  No   Do you need help with transportation? No   Do you need help shopping? No   Do you need help preparing meals?  No   Do you need help with housework?  No   Do you need help with laundry? No   Do you need help taking your medications? No   Do you need help managing money? No   Do you ever drive or ride in a car without wearing a seat belt? No   Have you felt unusual stress, anger or loneliness in the last month? No   Who do you live with? Spouse   If you need help, do you have trouble finding someone available to you? No   Have you been bothered in the last four weeks by sexual problems? No   Do you have difficulty concentrating, remembering or making decisions? No         Does the patient have evidence of cognitive impairment? No    Asprin use counseling:Does not need ASA (and currently is not on it)    Age-appropriate Screening Schedule:  Refer to the list below for future screening recommendations based on patient's age, sex and/or medical conditions. Orders for these recommended tests are listed in the plan section. The patient has been provided with a written plan.    Health Maintenance   Topic Date Due   • TDAP/TD VACCINES (1 - Tdap) 10/13/1968   • ZOSTER VACCINE (1 of 2) 10/13/1999   • LIPID PANEL  04/11/2020   • MAMMOGRAM  04/19/2020   • INFLUENZA VACCINE  08/01/2020   • COLONOSCOPY  09/11/2024       "    The following portions of the patient's history were reviewed and updated as appropriate: allergies, current medications, past family history, past medical history, past social history, past surgical history and problem list.    No outpatient medications prior to visit.     No facility-administered medications prior to visit.        Patient Active Problem List   Diagnosis   (none) - all problems resolved or deleted       Advanced Care Planning:  ACP discussion was held with the patient during this visit. Patient does not have an advance directive, information provided.    Review of Systems    Compared to one year ago, the patient feels her physical health is the same.  Compared to one year ago, the patient feels her mental health is the same.    Reviewed chart for potential of high risk medication in the elderly: not applicable  Reviewed chart for potential of harmful drug interactions in the elderly:not applicable    Objective         Vitals:    10/21/20 0931   BP: 120/72   BP Location: Left arm   Patient Position: Sitting   Cuff Size: Adult   Pulse: 64   Temp: 97.8 °F (36.6 °C)   SpO2: 98%   Weight: 52 kg (114 lb 9.6 oz)   Height: 162.6 cm (64\")       Body mass index is 19.67 kg/m².  Discussed the patient's BMI with her. The BMI is in the acceptable range.    Physical Exam          Assessment/Plan   Medicare Risks and Personalized Health Plan  CMS Preventative Services Quick Reference  Cardiovascular risk    The above risks/problems have been discussed with the patient.  Pertinent information has been shared with the patient in the After Visit Summary.  Follow up plans and orders are seen below in the Assessment/Plan Section.    There are no diagnoses linked to this encounter.  Follow Up:  No follow-ups on file.     An After Visit Summary and PPPS were given to the patient.             "

## 2021-01-06 ENCOUNTER — APPOINTMENT (OUTPATIENT)
Dept: MAMMOGRAPHY | Facility: HOSPITAL | Age: 71
End: 2021-01-06

## 2021-03-09 DIAGNOSIS — Z23 IMMUNIZATION DUE: ICD-10-CM

## 2021-04-28 ENCOUNTER — HOSPITAL ENCOUNTER (OUTPATIENT)
Dept: MAMMOGRAPHY | Facility: HOSPITAL | Age: 71
Discharge: HOME | End: 2021-04-28
Admitting: FAMILY MEDICINE

## 2021-04-28 PROCEDURE — 77063 BREAST TOMOSYNTHESIS BI: CPT

## 2021-04-28 PROCEDURE — 77067 SCR MAMMO BI INCL CAD: CPT

## 2021-06-09 ENCOUNTER — IMMUNIZATION (OUTPATIENT)
Dept: VACCINE CLINIC | Facility: HOSPITAL | Age: 71
End: 2021-06-09

## 2021-06-09 PROCEDURE — 0001A: CPT | Performed by: INTERNAL MEDICINE

## 2021-06-09 PROCEDURE — 91300 HC SARSCOV02 VAC 30MCG/0.3ML IM: CPT | Performed by: INTERNAL MEDICINE

## 2021-06-30 ENCOUNTER — IMMUNIZATION (OUTPATIENT)
Dept: VACCINE CLINIC | Facility: HOSPITAL | Age: 71
End: 2021-06-30

## 2021-06-30 PROCEDURE — 91300 HC SARSCOV02 VAC 30MCG/0.3ML IM: CPT | Performed by: INTERNAL MEDICINE

## 2021-06-30 PROCEDURE — 0002A: CPT | Performed by: INTERNAL MEDICINE

## 2021-10-27 ENCOUNTER — OFFICE VISIT (OUTPATIENT)
Dept: INTERNAL MEDICINE | Facility: CLINIC | Age: 71
End: 2021-10-27

## 2021-10-27 VITALS
HEIGHT: 64 IN | WEIGHT: 113 LBS | SYSTOLIC BLOOD PRESSURE: 132 MMHG | DIASTOLIC BLOOD PRESSURE: 64 MMHG | OXYGEN SATURATION: 99 % | HEART RATE: 74 BPM | BODY MASS INDEX: 19.29 KG/M2 | TEMPERATURE: 98.2 F

## 2021-10-27 DIAGNOSIS — E55.9 AVITAMINOSIS D: Primary | ICD-10-CM

## 2021-10-27 DIAGNOSIS — R73.09 ELEVATED GLUCOSE: ICD-10-CM

## 2021-10-27 DIAGNOSIS — E78.49 OTHER HYPERLIPIDEMIA: ICD-10-CM

## 2021-10-27 DIAGNOSIS — Z00.00 MEDICARE ANNUAL WELLNESS VISIT, SUBSEQUENT: ICD-10-CM

## 2021-10-27 PROCEDURE — 1170F FXNL STATUS ASSESSED: CPT | Performed by: FAMILY MEDICINE

## 2021-10-27 PROCEDURE — 99214 OFFICE O/P EST MOD 30 MIN: CPT | Performed by: FAMILY MEDICINE

## 2021-10-27 PROCEDURE — G0439 PPPS, SUBSEQ VISIT: HCPCS | Performed by: FAMILY MEDICINE

## 2021-10-27 PROCEDURE — 1126F AMNT PAIN NOTED NONE PRSNT: CPT | Performed by: FAMILY MEDICINE

## 2021-10-27 PROCEDURE — 1159F MED LIST DOCD IN RCRD: CPT | Performed by: FAMILY MEDICINE

## 2021-10-27 NOTE — PROGRESS NOTES
"The ABCs of the Annual Wellness Visit  Subsequent Medicare Wellness Visit    Chief Complaint   Patient presents with   • Medicare Wellness-subsequent      Subjective    History of Present Illness:  Kyaw Webb is a 71 y.o. female who presents for a Subsequent Medicare Wellness Visit.    The following portions of the patient's history were reviewed and   updated as appropriate: allergies, current medications, past family history, past medical history, past social history, past surgical history and problem list.    Compared to one year ago, the patient feels her physical   health is the same.    Compared to one year ago, the patient feels her mental   health is the same.    Recent Hospitalizations:  She was not admitted to the hospital during the last year.       Current Medical Providers:  Patient Care Team:  Jhoan Burnett Jr., MD as PCP - General (Family Medicine)    No outpatient medications prior to visit.     No facility-administered medications prior to visit.       No opioid medication identified on active medication list. I have reviewed chart for other potential  high risk medication/s and harmful drug interactions in the elderly.          Aspirin is not on active medication list.  Aspirin use is not indicated based on review of current medical condition/s. Risk of harm outweighs potential benefits.  .    Patient Active Problem List   Diagnosis   • Avitaminosis D   • Hyperlipidemia     Advance Care Planning  Advance Directive is not on file.  ACP discussion was held with the patient during this visit. Patient does not have an advance directive, information provided.          Objective    Vitals:    10/27/21 0951   BP: 132/64   Pulse: 74   Temp: 98.2 °F (36.8 °C)   SpO2: 99%   Weight: 51.3 kg (113 lb)   Height: 161.9 cm (63.75\")   PainSc: 0-No pain     BMI Readings from Last 1 Encounters:   10/27/21 19.55 kg/m²   BMI is within normal parameters. No follow-up required.    Does the patient have evidence " of cognitive impairment? No    Physical Exam            HEALTH RISK ASSESSMENT    Smoking Status:  Social History     Tobacco Use   Smoking Status Never Smoker   Smokeless Tobacco Never Used     Alcohol Consumption:  Social History     Substance and Sexual Activity   Alcohol Use No     Fall Risk Screen:    ALONZO Fall Risk Assessment was completed, and patient is at LOW risk for falls.Assessment completed on:10/27/2021    Depression Screening:  PHQ-2/PHQ-9 Depression Screening 10/27/2021   Little interest or pleasure in doing things 0   Feeling down, depressed, or hopeless 0   Trouble falling or staying asleep, or sleeping too much 0   Feeling tired or having little energy 0   Poor appetite or overeating 0   Feeling bad about yourself - or that you are a failure or have let yourself or your family down 0   Trouble concentrating on things, such as reading the newspaper or watching television 0   Moving or speaking so slowly that other people could have noticed. Or the opposite - being so fidgety or restless that you have been moving around a lot more than usual 0   Thoughts that you would be better off dead, or of hurting yourself in some way 0   Total Score 0   If you checked off any problems, how difficult have these problems made it for you to do your work, take care of things at home, or get along with other people? -       Health Habits and Functional and Cognitive Screening:  Functional & Cognitive Status 10/27/2021   Do you have difficulty preparing food and eating? No   Do you have difficulty bathing yourself, getting dressed or grooming yourself? No   Do you have difficulty using the toilet? No   Do you have difficulty moving around from place to place? No   Do you have trouble with steps or getting out of a bed or a chair? No   Current Diet Well Balanced Diet   Dental Exam Up to date   Eye Exam Up to date   Exercise (times per week) 7 times per week   Current Exercises Include Walking;Yard Work   Current  Exercise Activities Include -   Do you need help using the phone?  No   Are you deaf or do you have serious difficulty hearing?  No   Do you need help with transportation? No   Do you need help shopping? No   Do you need help preparing meals?  No   Do you need help with housework?  No   Do you need help with laundry? No   Do you need help taking your medications? No   Do you need help managing money? No   Do you ever drive or ride in a car without wearing a seat belt? No   Have you felt unusual stress, anger or loneliness in the last month? No   Who do you live with? Spouse   If you need help, do you have trouble finding someone available to you? No   Have you been bothered in the last four weeks by sexual problems? No   Do you have difficulty concentrating, remembering or making decisions? No       Age-appropriate Screening Schedule:  Refer to the list below for future screening recommendations based on patient's age, sex and/or medical conditions. Orders for these recommended tests are listed in the plan section. The patient has been provided with a written plan.    Health Maintenance   Topic Date Due   • DXA SCAN  Never done   • TDAP/TD VACCINES (1 - Tdap) Never done   • ZOSTER VACCINE (1 of 2) Never done   • INFLUENZA VACCINE  08/01/2021   • LIPID PANEL  10/21/2021   • MAMMOGRAM  04/28/2023              Assessment/Plan   CMS Preventative Services Quick Reference  Risk Factors Identified During Encounter  Alcohol Misuse  Fall Risk-High or Moderate  The above risks/problems have been discussed with the patient.  Follow up actions/plans if indicated are seen below in the Assessment/Plan Section.  Pertinent information has been shared with the patient in the After Visit Summary.    Diagnoses and all orders for this visit:    1. Avitaminosis D (Primary)  -     CBC & Differential  -     Comprehensive Metabolic Panel  -     Folate  -     Vitamin B12  -     Hemoglobin A1c  -     Lipid Panel With / Chol / HDL Ratio  -      TSH  -     T4, Free  -     Urinalysis With Microscopic If Indicated (No Culture) - Urine, Clean Catch  -     Vitamin D 25 Hydroxy    2. Other hyperlipidemia  -     CBC & Differential  -     Comprehensive Metabolic Panel  -     Folate  -     Vitamin B12  -     Hemoglobin A1c  -     Lipid Panel With / Chol / HDL Ratio  -     TSH  -     T4, Free  -     Urinalysis With Microscopic If Indicated (No Culture) - Urine, Clean Catch  -     Vitamin D 25 Hydroxy    3. Elevated glucose   -     Hemoglobin A1c        Follow Up:   No follow-ups on file.     An After Visit Summary and PPPS were made available to the patient.

## 2021-10-27 NOTE — PROGRESS NOTES
"Chief Complaint  Medicare Wellness-subsequent    Subjective          Chi John Kymberly Webb presents to [facility]  Delightful lady accompanied by her  who is generally very healthy.  She is here for Medicare wellness and we discussed diet exercise activity prevention of falling.  Cholesterol panel will be performed as well as metabolic panel.  Also check vitamin D.  She is up-to-date on mammograms.      Objective   Vital Signs:   /64   Pulse 74   Temp 98.2 °F (36.8 °C)   Ht 161.9 cm (63.75\")   Wt 51.3 kg (113 lb)   SpO2 99%   BMI 19.55 kg/m²     Physical Exam  Vitals reviewed.   Constitutional:       Appearance: She is well-developed.   HENT:      Head: Normocephalic and atraumatic.      Right Ear: Tympanic membrane and external ear normal.      Left Ear: Tympanic membrane and external ear normal.   Eyes:      Conjunctiva/sclera: Conjunctivae normal.      Pupils: Pupils are equal, round, and reactive to light.   Neck:      Thyroid: No thyromegaly.      Vascular: No JVD.   Cardiovascular:      Rate and Rhythm: Normal rate and regular rhythm.      Heart sounds: Normal heart sounds.      Comments: I could not hear the murmur that I heard in 2019.  She has evidence of aortic sclerosis and mild aortic regurgitation on prior echocardiogram.  Pulmonary:      Effort: Pulmonary effort is normal.      Breath sounds: Normal breath sounds.   Abdominal:      General: Bowel sounds are normal.      Palpations: Abdomen is soft.   Musculoskeletal:         General: Normal range of motion.      Cervical back: Normal range of motion and neck supple.   Lymphadenopathy:      Cervical: No cervical adenopathy.   Skin:     General: Skin is warm and dry.      Findings: No rash.   Neurological:      Mental Status: She is alert and oriented to person, place, and time.      Cranial Nerves: No cranial nerve deficit.      Coordination: Coordination normal.   Psychiatric:         Behavior: Behavior " normal.         Thought Content: Thought content normal.         Judgment: Judgment normal.        Result Review :                 Assessment and Plan    Diagnoses and all orders for this visit:    1. Avitaminosis D (Primary)  Comments:  Check vitamin D level  Orders:  -     CBC & Differential  -     Comprehensive Metabolic Panel  -     Folate  -     Vitamin B12  -     Hemoglobin A1c  -     Lipid Panel With / Chol / HDL Ratio  -     TSH  -     T4, Free  -     Urinalysis With Microscopic If Indicated (No Culture) - Urine, Clean Catch  -     Vitamin D 25 Hydroxy    2. Other hyperlipidemia  Comments:  Diet controlled.  Discussion about carbohydrates.  Recheck lipid panel.  Orders:  -     CBC & Differential  -     Comprehensive Metabolic Panel  -     Folate  -     Vitamin B12  -     Hemoglobin A1c  -     Lipid Panel With / Chol / HDL Ratio  -     TSH  -     T4, Free  -     Urinalysis With Microscopic If Indicated (No Culture) - Urine, Clean Catch  -     Vitamin D 25 Hydroxy    3. Elevated glucose   Comments:  Recheck A1c  Orders:  -     Hemoglobin A1c    4. Medicare annual wellness visit, subsequent        Follow Up   Return in about 1 year (around 10/27/2022) for Medicare Wellness.  Patient was given instructions and counseling regarding her condition or for health maintenance advice. Please see specific information pulled into the AVS if appropriate.

## 2021-10-27 NOTE — PATIENT INSTRUCTIONS
Medicare Wellness  Personal Prevention Plan of Service     Date of Office Visit:  10/27/2021  Encounter Provider:  Jhoan Burentt Jr, MD  Place of Service:  [facility]  Patient Name: Kyaw Webb  :  1950    As part of the Medicare Wellness portion of your visit today, we are providing you with this personalized preventive plan of services (PPPS). This plan is based upon recommendations of the [place] Preventive Services Task Force (USPSTF) and the Advisory Committee on Immunization Practices (ACIP).    This lists the preventive care services that should be considered, and provides dates of when you are due. Items listed as completed are up-to-date and do not require any further intervention.    Health Maintenance   Topic Date Due   • DXA SCAN  Never done   • TDAP/TD VACCINES (1 - Tdap) Never done   • ZOSTER VACCINE (1 of 2) Never done   • Pneumococcal Vaccine 65+ (1 of 1 - PPSV23) Never done   • HEPATITIS C SCREENING  Never done   • INFLUENZA VACCINE  2021   • LIPID PANEL  10/21/2021   • COVID-19 Vaccine (3 - Pfizer booster) 2021   • ANNUAL WELLNESS VISIT  10/27/2022   • MAMMOGRAM  2023   • COLORECTAL CANCER SCREENING  2024       Orders Placed This Encounter   Procedures   • Comprehensive Metabolic Panel     Order Specific Question:   Release to patient     Answer:   Immediate   • Folate     Order Specific Question:   Release to patient     Answer:   Immediate   • Vitamin B12     Order Specific Question:   Release to patient     Answer:   Immediate   • Hemoglobin A1c     Order Specific Question:   Release to patient     Answer:   Immediate   • Lipid Panel With / Chol / HDL Ratio     Order Specific Question:   Release to patient     Answer:   Immediate   • TSH     Order Specific Question:   Release to patient     Answer:   Immediate   • T4, Free     Order Specific Question:   Release to patient     Answer:   Immediate   • Urinalysis With  Microscopic If Indicated (No Culture) - Urine, Clean Catch     Order Specific Question:   Release to patient     Answer:   Immediate   • Vitamin D 25 Hydroxy     Order Specific Question:   Release to patient     Answer:   Immediate   • CBC & Differential     Order Specific Question:   Manual Differential     Answer:   No       Return in about 1 year (around 10/27/2022) for Medicare Wellness.

## 2021-10-28 LAB
25(OH)D3+25(OH)D2 SERPL-MCNC: 29.1 NG/ML (ref 30–100)
ALBUMIN SERPL-MCNC: 4.7 G/DL (ref 3.7–4.7)
ALBUMIN/GLOB SERPL: 1.5 {RATIO} (ref 1.2–2.2)
ALP SERPL-CCNC: 93 IU/L (ref 44–121)
ALT SERPL-CCNC: 21 IU/L (ref 0–32)
APPEARANCE UR: CLEAR
AST SERPL-CCNC: 27 IU/L (ref 0–40)
BACTERIA #/AREA URNS HPF: NORMAL /[HPF]
BASOPHILS # BLD AUTO: 0 X10E3/UL (ref 0–0.2)
BASOPHILS NFR BLD AUTO: 1 %
BILIRUB SERPL-MCNC: 2.3 MG/DL (ref 0–1.2)
BILIRUB UR QL STRIP: NEGATIVE
BUN SERPL-MCNC: 16 MG/DL (ref 8–27)
BUN/CREAT SERPL: 19 (ref 12–28)
CALCIUM SERPL-MCNC: 9.6 MG/DL (ref 8.7–10.3)
CASTS URNS QL MICRO: NORMAL /LPF
CHLORIDE SERPL-SCNC: 100 MMOL/L (ref 96–106)
CHOLEST SERPL-MCNC: 229 MG/DL (ref 100–199)
CHOLEST/HDLC SERPL: 3.6 RATIO (ref 0–4.4)
CO2 SERPL-SCNC: 24 MMOL/L (ref 20–29)
COLOR UR: YELLOW
CREAT SERPL-MCNC: 0.85 MG/DL (ref 0.57–1)
EOSINOPHIL # BLD AUTO: 0 X10E3/UL (ref 0–0.4)
EOSINOPHIL NFR BLD AUTO: 1 %
EPI CELLS #/AREA URNS HPF: NORMAL /HPF (ref 0–10)
ERYTHROCYTE [DISTWIDTH] IN BLOOD BY AUTOMATED COUNT: 12.1 % (ref 11.7–15.4)
FOLATE SERPL-MCNC: >20 NG/ML
GLOBULIN SER CALC-MCNC: 3.1 G/DL (ref 1.5–4.5)
GLUCOSE SERPL-MCNC: 106 MG/DL (ref 65–99)
GLUCOSE UR QL: NEGATIVE
HBA1C MFR BLD: 5.5 % (ref 4.8–5.6)
HCT VFR BLD AUTO: 42.7 % (ref 34–46.6)
HDLC SERPL-MCNC: 64 MG/DL
HGB BLD-MCNC: 14.4 G/DL (ref 11.1–15.9)
HGB UR QL STRIP: ABNORMAL
IMM GRANULOCYTES # BLD AUTO: 0 X10E3/UL (ref 0–0.1)
IMM GRANULOCYTES NFR BLD AUTO: 0 %
KETONES UR QL STRIP: ABNORMAL
LDLC SERPL CALC-MCNC: 142 MG/DL (ref 0–99)
LEUKOCYTE ESTERASE UR QL STRIP: NEGATIVE
LYMPHOCYTES # BLD AUTO: 1.3 X10E3/UL (ref 0.7–3.1)
LYMPHOCYTES NFR BLD AUTO: 31 %
MCH RBC QN AUTO: 32.1 PG (ref 26.6–33)
MCHC RBC AUTO-ENTMCNC: 33.7 G/DL (ref 31.5–35.7)
MCV RBC AUTO: 95 FL (ref 79–97)
MICRO URNS: ABNORMAL
MONOCYTES # BLD AUTO: 0.2 X10E3/UL (ref 0.1–0.9)
MONOCYTES NFR BLD AUTO: 4 %
NEUTROPHILS # BLD AUTO: 2.7 X10E3/UL (ref 1.4–7)
NEUTROPHILS NFR BLD AUTO: 63 %
NITRITE UR QL STRIP: NEGATIVE
PH UR STRIP: 6 [PH] (ref 5–7.5)
PLATELET # BLD AUTO: 207 X10E3/UL (ref 150–450)
POTASSIUM SERPL-SCNC: 3.5 MMOL/L (ref 3.5–5.2)
PROT SERPL-MCNC: 7.8 G/DL (ref 6–8.5)
PROT UR QL STRIP: NEGATIVE
RBC # BLD AUTO: 4.49 X10E6/UL (ref 3.77–5.28)
RBC #/AREA URNS HPF: NORMAL /HPF (ref 0–2)
SODIUM SERPL-SCNC: 139 MMOL/L (ref 134–144)
SP GR UR: 1.01 (ref 1–1.03)
T4 FREE SERPL-MCNC: 1.2 NG/DL (ref 0.82–1.77)
TRIGL SERPL-MCNC: 128 MG/DL (ref 0–149)
TSH SERPL DL<=0.005 MIU/L-ACNC: 1.58 UIU/ML (ref 0.45–4.5)
UROBILINOGEN UR STRIP-MCNC: 0.2 MG/DL (ref 0.2–1)
VIT B12 SERPL-MCNC: 359 PG/ML (ref 232–1245)
VLDLC SERPL CALC-MCNC: 23 MG/DL (ref 5–40)
WBC # BLD AUTO: 4.3 X10E3/UL (ref 3.4–10.8)
WBC #/AREA URNS HPF: NORMAL /HPF (ref 0–5)

## 2022-11-02 ENCOUNTER — OFFICE VISIT (OUTPATIENT)
Dept: INTERNAL MEDICINE | Facility: CLINIC | Age: 72
End: 2022-11-02

## 2022-11-02 VITALS
DIASTOLIC BLOOD PRESSURE: 76 MMHG | TEMPERATURE: 97.5 F | WEIGHT: 114 LBS | HEART RATE: 73 BPM | SYSTOLIC BLOOD PRESSURE: 134 MMHG | BODY MASS INDEX: 19.72 KG/M2 | OXYGEN SATURATION: 98 %

## 2022-11-02 DIAGNOSIS — I35.8 AORTIC VALVE SCLEROSIS: Primary | ICD-10-CM

## 2022-11-02 DIAGNOSIS — R73.09 ELEVATED GLUCOSE: ICD-10-CM

## 2022-11-02 DIAGNOSIS — E55.9 AVITAMINOSIS D: ICD-10-CM

## 2022-11-02 DIAGNOSIS — Z00.00 MEDICARE ANNUAL WELLNESS VISIT, SUBSEQUENT: ICD-10-CM

## 2022-11-02 DIAGNOSIS — E78.49 OTHER HYPERLIPIDEMIA: ICD-10-CM

## 2022-11-02 PROCEDURE — 1126F AMNT PAIN NOTED NONE PRSNT: CPT | Performed by: FAMILY MEDICINE

## 2022-11-02 PROCEDURE — 1170F FXNL STATUS ASSESSED: CPT | Performed by: FAMILY MEDICINE

## 2022-11-02 PROCEDURE — 1159F MED LIST DOCD IN RCRD: CPT | Performed by: FAMILY MEDICINE

## 2022-11-02 PROCEDURE — G0439 PPPS, SUBSEQ VISIT: HCPCS | Performed by: FAMILY MEDICINE

## 2022-11-02 NOTE — PROGRESS NOTES
The ABCs of the Annual Wellness Visit  Subsequent Medicare Wellness Visit    Chief Complaint   Patient presents with   • Medicare Wellness-subsequent      Subjective    History of Present Illness:  Kyaw Webb is a 72 y.o. female who presents for a Subsequent Medicare Wellness Visit. She is accompanied by an adult male.     Ms. Webb does not have a living will. She reports that she walks and rake leaves daily.     She had an echocardiogram in XX/2019. The patient had a very mild aortic regurgitation, which has not changed at all. The patient was following Dr. Subramanian until he retired. Denies leg numbness.     The adult male states she has had chickenpox and shingles.     The following portions of the patient's history were reviewed and   updated as appropriate: allergies, current medications, past family history, past medical history, past social history, past surgical history and problem list.    Compared to one year ago, the patient feels her physical   health is the same.    Compared to one year ago, the patient feels her mental   health is the same.    Recent Hospitalizations:  She was not admitted to the hospital during the last year.       Current Medical Providers:  Patient Care Team:  Jhoan Burnett MD as PCP - General (Family Medicine)    No outpatient medications prior to visit.     No facility-administered medications prior to visit.       No opioid medication identified on active medication list. I have reviewed chart for other potential  high risk medication/s and harmful drug interactions in the elderly.          Aspirin is not on active medication list.  Aspirin use is not indicated based on review of current medical condition/s. Risk of harm outweighs potential benefits.  .    Patient Active Problem List   Diagnosis   • Avitaminosis D   • Hyperlipidemia     Advance Care Planning  Advance Directive is not on file.  ACP discussion was held with the patient during this visit. Patient does not  "have an advance directive, information provided.          Objective    Vitals:    11/02/22 1008   BP: 134/76   BP Location: Left arm   Patient Position: Sitting   Cuff Size: Adult   Pulse: 73   Temp: 97.5 °F (36.4 °C)   TempSrc: Tympanic   SpO2: 98%   Weight: 51.7 kg (114 lb)   PainSc: 0-No pain     Estimated body mass index is 19.72 kg/m² as calculated from the following:    Height as of 10/27/21: 161.9 cm (63.75\").    Weight as of this encounter: 51.7 kg (114 lb).    BMI is within normal parameters. No other follow-up for BMI required.      Does the patient have evidence of cognitive impairment? No    Physical Exam  Vitals reviewed.   Constitutional:       Appearance: She is well-developed.   HENT:      Head: Normocephalic and atraumatic.      Right Ear: Tympanic membrane and external ear normal.      Left Ear: Tympanic membrane and external ear normal.      Nose: Nose normal.   Eyes:      Conjunctiva/sclera: Conjunctivae normal.      Pupils: Pupils are equal, round, and reactive to light.   Neck:      Thyroid: No thyromegaly.      Vascular: No JVD.   Cardiovascular:      Rate and Rhythm: Normal rate and regular rhythm.      Heart sounds: Normal heart sounds.   Pulmonary:      Effort: Pulmonary effort is normal.      Breath sounds: Normal breath sounds.   Abdominal:      General: Bowel sounds are normal.      Palpations: Abdomen is soft.   Musculoskeletal:         General: Normal range of motion.      Cervical back: Normal range of motion and neck supple.   Lymphadenopathy:      Cervical: No cervical adenopathy.   Skin:     General: Skin is warm and dry.      Findings: No rash.   Neurological:      Mental Status: She is alert and oriented to person, place, and time.      Cranial Nerves: No cranial nerve deficit.      Coordination: Coordination normal.   Psychiatric:         Behavior: Behavior normal.         Thought Content: Thought content normal.         Judgment: Judgment normal.                 HEALTH RISK " ASSESSMENT    Smoking Status:  Social History     Tobacco Use   Smoking Status Never   Smokeless Tobacco Never     Alcohol Consumption:  Social History     Substance and Sexual Activity   Alcohol Use No     Fall Risk Screen:    KENDRAADI Fall Risk Assessment was completed, and patient is at LOW risk for falls.Assessment completed on:11/2/2022    Depression Screening:  PHQ-2/PHQ-9 Depression Screening 11/2/2022   Retired PHQ-9 Total Score -   Retired Total Score -   Little Interest or Pleasure in Doing Things 0-->not at all   Feeling Down, Depressed or Hopeless 0-->not at all   PHQ-9: Brief Depression Severity Measure Score 0       Health Habits and Functional and Cognitive Screening:  Functional & Cognitive Status 11/2/2022   Do you have difficulty preparing food and eating? No   Do you have difficulty bathing yourself, getting dressed or grooming yourself? No   Do you have difficulty using the toilet? No   Do you have difficulty moving around from place to place? No   Do you have trouble with steps or getting out of a bed or a chair? No   Current Diet Well Balanced Diet   Dental Exam Up to date   Eye Exam Up to date   Exercise (times per week) 5 times per week   Current Exercises Include Walking   Current Exercise Activities Include -   Do you need help using the phone?  No   Are you deaf or do you have serious difficulty hearing?  No   Do you need help with transportation? No   Do you need help shopping? No   Do you need help preparing meals?  No   Do you need help with housework?  No   Do you need help with laundry? No   Do you need help taking your medications? No   Do you need help managing money? No   Do you ever drive or ride in a car without wearing a seat belt? No   Have you felt unusual stress, anger or loneliness in the last month? No   Who do you live with? Spouse   If you need help, do you have trouble finding someone available to you? No   Have you been bothered in the last four weeks by sexual problems?  No   Do you have difficulty concentrating, remembering or making decisions? No       Age-appropriate Screening Schedule:  Refer to the list below for future screening recommendations based on patient's age, sex and/or medical conditions. Orders for these recommended tests are listed in the plan section. The patient has been provided with a written plan.    Health Maintenance   Topic Date Due   • DXA SCAN  Never done   • TDAP/TD VACCINES (1 - Tdap) Never done   • ZOSTER VACCINE (1 of 2) Never done   • INFLUENZA VACCINE  08/01/2022   • LIPID PANEL  10/27/2022   • MAMMOGRAM  04/28/2023              Assessment & Plan   CMS Preventative Services Quick Reference  Risk Factors Identified During Encounter  Cardiovascular Disease  The above risks/problems have been discussed with the patient.  Follow up actions/plans if indicated are seen below in the Assessment/Plan Section.  Pertinent information has been shared with the patient in the After Visit Summary.    Diagnoses and all orders for this visit:    1. Aortic valve sclerosis (Primary)  -     Urinalysis With Microscopic If Indicated (No Culture) - Urine, Clean Catch  -     TSH  -     T4, Free  -     Lipid Panel With / Chol / HDL Ratio  -     CBC & Differential  -     Comprehensive Metabolic Panel  -     Hemoglobin A1c  -     Vitamin B12    2. Other hyperlipidemia  -     Urinalysis With Microscopic If Indicated (No Culture) - Urine, Clean Catch  -     TSH  -     T4, Free  -     Lipid Panel With / Chol / HDL Ratio  -     CBC & Differential  -     Comprehensive Metabolic Panel  -     Hemoglobin A1c  -     Vitamin B12    3. Medicare annual wellness visit, subsequent    4. Avitaminosis D  -     Urinalysis With Microscopic If Indicated (No Culture) - Urine, Clean Catch  -     TSH  -     T4, Free  -     Lipid Panel With / Chol / HDL Ratio  -     CBC & Differential  -     Comprehensive Metabolic Panel  -     Hemoglobin A1c  -     Vitamin B12    5. Elevated glucose  -      Urinalysis With Microscopic If Indicated (No Culture) - Urine, Clean Catch  -     TSH  -     T4, Free  -     Lipid Panel With / Chol / HDL Ratio  -     CBC & Differential  -     Comprehensive Metabolic Panel  -     Hemoglobin A1c  -     Vitamin B12        Follow Up:   Return in about 1 year (around 11/2/2023) for Medicare Wellness.     An After Visit Summary and PPPS were made available to the patient.                   Transcribed from ambient dictation for Jhoan Burnett MD by Trupti Mcmillan.  11/02/22   13:20 EDT    Patient or patient representative verbalized consent to the visit recording.  I have personally performed the services described in this document as transcribed by the above individual, and it is both accurate and complete.

## 2022-11-02 NOTE — PROGRESS NOTES
The ABCs of the Annual Wellness Visit  Subsequent Medicare Wellness Visit    Chief Complaint   Patient presents with   • Medicare Wellness-subsequent      Subjective    History of Present Illness:  Kyaw Webb is a 72 y.o. female who presents for a Subsequent Medicare Wellness Visit. She is accompanied by an adult male.     Ms. Webb does not have a living will. She reports that she walks and rake leaves daily.     She had an echocardiogram in XX/2019. The patient had a very mild aortic regurgitation, which has not changed at all. The patient was following Dr. Subramanian until he retired. Denies leg numbness.     The adult male states she has had chickenpox and shingles.     The following portions of the patient's history were reviewed and   updated as appropriate: allergies, current medications, past family history, past medical history, past social history, past surgical history and problem list.    Compared to one year ago, the patient feels her physical   health is the same.    Compared to one year ago, the patient feels her mental   health is the same.    Recent Hospitalizations:  She was not admitted to the hospital during the last year.       Current Medical Providers:  Patient Care Team:  Jhoan Burnett MD as PCP - General (Family Medicine)    No outpatient medications prior to visit.     No facility-administered medications prior to visit.       No opioid medication identified on active medication list. I have reviewed chart for other potential  high risk medication/s and harmful drug interactions in the elderly.          Aspirin is not on active medication list.  Aspirin use is not indicated based on review of current medical condition/s. Risk of harm outweighs potential benefits.  .    Patient Active Problem List   Diagnosis   • Avitaminosis D   • Hyperlipidemia     Advance Care Planning  Advance Directive is not on file.  ACP discussion was held with the patient during this visit. Patient does not  "have an advance directive, information provided.    Review of Systems   Hematological: Positive for adenopathy.        Objective    Vitals:    11/02/22 1008   BP: 134/76   BP Location: Left arm   Patient Position: Sitting   Cuff Size: Adult   Pulse: 73   Temp: 97.5 °F (36.4 °C)   TempSrc: Tympanic   SpO2: 98%   Weight: 51.7 kg (114 lb)   PainSc: 0-No pain     Estimated body mass index is 19.72 kg/m² as calculated from the following:    Height as of 10/27/21: 161.9 cm (63.75\").    Weight as of this encounter: 51.7 kg (114 lb).    BMI is within normal parameters. No other follow-up for BMI required.      Does the patient have evidence of cognitive impairment? No    Physical Exam  Vitals reviewed.   Constitutional:       Appearance: She is well-developed.   HENT:      Head: Normocephalic and atraumatic.      Right Ear: Tympanic membrane and external ear normal.      Left Ear: Tympanic membrane and external ear normal.      Nose: Nose normal.   Eyes:      Conjunctiva/sclera: Conjunctivae normal.      Pupils: Pupils are equal, round, and reactive to light.   Neck:      Thyroid: No thyromegaly.      Vascular: No JVD.   Cardiovascular:      Rate and Rhythm: Normal rate and regular rhythm.      Heart sounds: Normal heart sounds.   Pulmonary:      Effort: Pulmonary effort is normal.      Breath sounds: Normal breath sounds.   Abdominal:      General: Bowel sounds are normal.      Palpations: Abdomen is soft.   Musculoskeletal:         General: Normal range of motion.      Cervical back: Normal range of motion and neck supple.   Lymphadenopathy:      Cervical: No cervical adenopathy.   Skin:     General: Skin is warm and dry.      Findings: No rash.   Neurological:      Mental Status: She is alert and oriented to person, place, and time.      Cranial Nerves: No cranial nerve deficit.      Coordination: Coordination normal.   Psychiatric:         Behavior: Behavior normal.         Thought Content: Thought content normal.       "   Judgment: Judgment normal.                 HEALTH RISK ASSESSMENT    Smoking Status:  Social History     Tobacco Use   Smoking Status Never   Smokeless Tobacco Never     Alcohol Consumption:  Social History     Substance and Sexual Activity   Alcohol Use No     Fall Risk Screen:    ALONZO Fall Risk Assessment was completed, and patient is at LOW risk for falls.Assessment completed on:11/2/2022    Depression Screening:  PHQ-2/PHQ-9 Depression Screening 11/2/2022   Retired PHQ-9 Total Score -   Retired Total Score -   Little Interest or Pleasure in Doing Things 0-->not at all   Feeling Down, Depressed or Hopeless 0-->not at all   PHQ-9: Brief Depression Severity Measure Score 0       Health Habits and Functional and Cognitive Screening:  Functional & Cognitive Status 11/2/2022   Do you have difficulty preparing food and eating? No   Do you have difficulty bathing yourself, getting dressed or grooming yourself? No   Do you have difficulty using the toilet? No   Do you have difficulty moving around from place to place? No   Do you have trouble with steps or getting out of a bed or a chair? No   Current Diet Well Balanced Diet   Dental Exam Up to date   Eye Exam Up to date   Exercise (times per week) 5 times per week   Current Exercises Include Walking   Current Exercise Activities Include -   Do you need help using the phone?  No   Are you deaf or do you have serious difficulty hearing?  No   Do you need help with transportation? No   Do you need help shopping? No   Do you need help preparing meals?  No   Do you need help with housework?  No   Do you need help with laundry? No   Do you need help taking your medications? No   Do you need help managing money? No   Do you ever drive or ride in a car without wearing a seat belt? No   Have you felt unusual stress, anger or loneliness in the last month? No   Who do you live with? Spouse   If you need help, do you have trouble finding someone available to you? No   Have you  been bothered in the last four weeks by sexual problems? No   Do you have difficulty concentrating, remembering or making decisions? No       Age-appropriate Screening Schedule:  Refer to the list below for future screening recommendations based on patient's age, sex and/or medical conditions. Orders for these recommended tests are listed in the plan section. The patient has been provided with a written plan.    Health Maintenance   Topic Date Due   • DXA SCAN  Never done   • TDAP/TD VACCINES (1 - Tdap) Never done   • ZOSTER VACCINE (1 of 2) Never done   • INFLUENZA VACCINE  08/01/2022   • LIPID PANEL  10/27/2022   • MAMMOGRAM  04/28/2023              Assessment & Plan   CMS Preventative Services Quick Reference  Risk Factors Identified During Encounter  Cardiovascular Disease  The above risks/problems have been discussed with the patient.  Follow up actions/plans if indicated are seen below in the Assessment/Plan Section.  Pertinent information has been shared with the patient in the After Visit Summary.    Diagnoses and all orders for this visit:    1. Aortic valve sclerosis (Primary)  -     Urinalysis With Microscopic If Indicated (No Culture) - Urine, Clean Catch  -     TSH  -     T4, Free  -     Lipid Panel With / Chol / HDL Ratio  -     CBC & Differential  -     Comprehensive Metabolic Panel  -     Hemoglobin A1c  -     Vitamin B12    2. Other hyperlipidemia  -     Urinalysis With Microscopic If Indicated (No Culture) - Urine, Clean Catch  -     TSH  -     T4, Free  -     Lipid Panel With / Chol / HDL Ratio  -     CBC & Differential  -     Comprehensive Metabolic Panel  -     Hemoglobin A1c  -     Vitamin B12    3. Medicare annual wellness visit, subsequent    4. Avitaminosis D  -     Urinalysis With Microscopic If Indicated (No Culture) - Urine, Clean Catch  -     TSH  -     T4, Free  -     Lipid Panel With / Chol / HDL Ratio  -     CBC & Differential  -     Comprehensive Metabolic Panel  -     Hemoglobin  A1c  -     Vitamin B12    5. Elevated glucose  -     Urinalysis With Microscopic If Indicated (No Culture) - Urine, Clean Catch  -     TSH  -     T4, Free  -     Lipid Panel With / Chol / HDL Ratio  -     CBC & Differential  -     Comprehensive Metabolic Panel  -     Hemoglobin A1c  -     Vitamin B12        Follow Up:   Return in about 1 year (around 11/2/2023) for Medicare Wellness.     An After Visit Summary and PPPS were made available to the patient.                   Transcribed from ambient dictation for Jhoan Burnett MD by Trupti Mcmillan.  11/02/22   13:20 EDT    Patient or patient representative verbalized consent to the visit recording.  I have personally performed the services described in this document as transcribed by the above individual, and it is both accurate and complete.

## 2022-11-02 NOTE — PROGRESS NOTES
"Chief Complaint  Medicare Wellness-subsequent    Subjective        Chi John Kymberly Webb presents to [facility]  History of Present Illness    Objective   Vital Signs:  /76 (BP Location: Left arm, Patient Position: Sitting, Cuff Size: Adult)   Pulse 73   Temp 97.5 °F (36.4 °C) (Tympanic)   Wt 51.7 kg (114 lb)   SpO2 98%   BMI 19.72 kg/m²   Estimated body mass index is 19.72 kg/m² as calculated from the following:    Height as of 10/27/21: 161.9 cm (63.75\").    Weight as of this encounter: 51.7 kg (114 lb).    BMI is within normal parameters. No other follow-up for BMI required.      Physical Exam   Result Review :  The following data was reviewed by: Jhoan Burnett MD on 11/02/2022:              Assessment and Plan   There are no diagnoses linked to this encounter.         Follow Up   No follow-ups on file.  Patient was given instructions and counseling regarding her condition or for health maintenance advice. Please see specific information pulled into the AVS if appropriate.       " Cardiac

## 2022-11-03 LAB
ALBUMIN SERPL-MCNC: 4.9 G/DL (ref 3.5–5.2)
ALBUMIN/GLOB SERPL: 2 G/DL
ALP SERPL-CCNC: 81 U/L (ref 39–117)
ALT SERPL-CCNC: 19 U/L (ref 1–33)
APPEARANCE UR: CLEAR
AST SERPL-CCNC: 29 U/L (ref 1–32)
BACTERIA #/AREA URNS HPF: NORMAL /HPF
BASOPHILS # BLD AUTO: 0.02 10*3/MM3 (ref 0–0.2)
BASOPHILS NFR BLD AUTO: 0.4 % (ref 0–1.5)
BILIRUB SERPL-MCNC: 1.7 MG/DL (ref 0–1.2)
BILIRUB UR QL STRIP: NEGATIVE
BUN SERPL-MCNC: 15 MG/DL (ref 8–23)
BUN/CREAT SERPL: 18.1 (ref 7–25)
CALCIUM SERPL-MCNC: 9.7 MG/DL (ref 8.6–10.5)
CASTS URNS MICRO: NORMAL
CHLORIDE SERPL-SCNC: 101 MMOL/L (ref 98–107)
CHOLEST SERPL-MCNC: 233 MG/DL (ref 0–200)
CHOLEST/HDLC SERPL: 3.7 {RATIO}
CO2 SERPL-SCNC: 28 MMOL/L (ref 22–29)
COLOR UR: YELLOW
CREAT SERPL-MCNC: 0.83 MG/DL (ref 0.57–1)
EGFRCR SERPLBLD CKD-EPI 2021: 74.5 ML/MIN/1.73
EOSINOPHIL # BLD AUTO: 0.05 10*3/MM3 (ref 0–0.4)
EOSINOPHIL NFR BLD AUTO: 0.9 % (ref 0.3–6.2)
EPI CELLS #/AREA URNS HPF: NORMAL /HPF
ERYTHROCYTE [DISTWIDTH] IN BLOOD BY AUTOMATED COUNT: 12.1 % (ref 12.3–15.4)
GLOBULIN SER CALC-MCNC: 2.5 GM/DL
GLUCOSE SERPL-MCNC: 99 MG/DL (ref 65–99)
GLUCOSE UR QL STRIP: NEGATIVE
HBA1C MFR BLD: 5.2 % (ref 4.8–5.6)
HCT VFR BLD AUTO: 42.8 % (ref 34–46.6)
HDLC SERPL-MCNC: 63 MG/DL (ref 40–60)
HGB BLD-MCNC: 13.8 G/DL (ref 12–15.9)
HGB UR QL STRIP: ABNORMAL
IMM GRANULOCYTES # BLD AUTO: 0.01 10*3/MM3 (ref 0–0.05)
IMM GRANULOCYTES NFR BLD AUTO: 0.2 % (ref 0–0.5)
KETONES UR QL STRIP: NEGATIVE
LDLC SERPL CALC-MCNC: 145 MG/DL (ref 0–100)
LEUKOCYTE ESTERASE UR QL STRIP: NEGATIVE
LYMPHOCYTES # BLD AUTO: 1.44 10*3/MM3 (ref 0.7–3.1)
LYMPHOCYTES NFR BLD AUTO: 26.3 % (ref 19.6–45.3)
MCH RBC QN AUTO: 31.2 PG (ref 26.6–33)
MCHC RBC AUTO-ENTMCNC: 32.2 G/DL (ref 31.5–35.7)
MCV RBC AUTO: 96.8 FL (ref 79–97)
MONOCYTES # BLD AUTO: 0.28 10*3/MM3 (ref 0.1–0.9)
MONOCYTES NFR BLD AUTO: 5.1 % (ref 5–12)
NEUTROPHILS # BLD AUTO: 3.68 10*3/MM3 (ref 1.7–7)
NEUTROPHILS NFR BLD AUTO: 67.1 % (ref 42.7–76)
NITRITE UR QL STRIP: NEGATIVE
NRBC BLD AUTO-RTO: 0 /100 WBC (ref 0–0.2)
PH UR STRIP: 6 [PH] (ref 5–8)
PLATELET # BLD AUTO: 184 10*3/MM3 (ref 140–450)
POTASSIUM SERPL-SCNC: 3.8 MMOL/L (ref 3.5–5.2)
PROT SERPL-MCNC: 7.4 G/DL (ref 6–8.5)
PROT UR QL STRIP: NEGATIVE
RBC # BLD AUTO: 4.42 10*6/MM3 (ref 3.77–5.28)
RBC #/AREA URNS HPF: NORMAL /HPF
SODIUM SERPL-SCNC: 141 MMOL/L (ref 136–145)
SP GR UR STRIP: 1.01 (ref 1–1.03)
T4 FREE SERPL-MCNC: 1.18 NG/DL (ref 0.93–1.7)
TRIGL SERPL-MCNC: 140 MG/DL (ref 0–150)
TSH SERPL DL<=0.005 MIU/L-ACNC: 1.34 UIU/ML (ref 0.27–4.2)
UROBILINOGEN UR STRIP-MCNC: ABNORMAL MG/DL
VIT B12 SERPL-MCNC: 386 PG/ML (ref 211–946)
VLDLC SERPL CALC-MCNC: 25 MG/DL (ref 5–40)
WBC # BLD AUTO: 5.48 10*3/MM3 (ref 3.4–10.8)
WBC #/AREA URNS HPF: NORMAL /HPF

## 2022-11-10 NOTE — PROGRESS NOTES
Sorry for delay.  The urinalysis is normal.      No evidence of diabetes what so ever.      Thyroid function is normal.    Cholesterol panel is similar to prior levels.  The total cholesterol is slightly elevated with elevated LDL/Bad cholesterol.  The HDL/good cholesterol is not bad.  The question is whether to treat the cholesterol with a statin.  Vascular screening of carotids, aorta, and leg circulation is easy to do and not expensive which may give an answer to plaque formation.  I think your overall lifestyle, diet etc are excellent.  We can do vascular screening if okay with you.      Otherwise chemistry panel looks pretty normal with normal kidney and liver function.  The mildly elevated bilirubin which is been a finding frequently is most likely a metabolic anomaly that is not hurting you.  Some people metabolize bilirubin slower than others.    Please let me know if you have further questions or wish to do a vascular screening bundle.

## 2023-11-09 ENCOUNTER — OFFICE VISIT (OUTPATIENT)
Dept: INTERNAL MEDICINE | Facility: CLINIC | Age: 73
End: 2023-11-09
Payer: MEDICARE

## 2023-11-09 VITALS
OXYGEN SATURATION: 99 % | DIASTOLIC BLOOD PRESSURE: 76 MMHG | WEIGHT: 114.4 LBS | HEART RATE: 75 BPM | BODY MASS INDEX: 19.53 KG/M2 | HEIGHT: 64 IN | SYSTOLIC BLOOD PRESSURE: 144 MMHG

## 2023-11-09 DIAGNOSIS — Z12.31 ENCOUNTER FOR SCREENING MAMMOGRAM FOR MALIGNANT NEOPLASM OF BREAST: ICD-10-CM

## 2023-11-09 DIAGNOSIS — Z00.00 MEDICARE ANNUAL WELLNESS VISIT, SUBSEQUENT: ICD-10-CM

## 2023-11-09 DIAGNOSIS — E55.9 AVITAMINOSIS D: ICD-10-CM

## 2023-11-09 DIAGNOSIS — R73.09 ELEVATED GLUCOSE: ICD-10-CM

## 2023-11-09 DIAGNOSIS — E53.8 B12 DEFICIENCY: ICD-10-CM

## 2023-11-09 DIAGNOSIS — R01.1 SYSTOLIC MURMUR: ICD-10-CM

## 2023-11-09 DIAGNOSIS — E78.2 MIXED HYPERLIPIDEMIA: Primary | ICD-10-CM

## 2023-11-09 NOTE — PROGRESS NOTES
"Chief Complaint  Medicare Wellness-subsequent    Subjective        Chi John Kymberly Webb presents to [facility]  History of Present Illness  Delightful lady who maintains good health.  We discussed cardiovascular screening and cholesterol management and she does not wish to get any extra testing at this time.    She agrees to mammograms and those will be ordered and also up-to-date and order colonoscopy in 2024.    Otherwise screening test and reviewed echocardiogram for systolic murmur with stiffness of the aortic valve without aortic stenosis.          Objective   Vital Signs:  /76 (BP Location: Left arm, Patient Position: Sitting, Cuff Size: Adult)   Pulse 75   Ht 161.9 cm (63.75\")   Wt 51.9 kg (114 lb 6.4 oz)   SpO2 99%   BMI 19.79 kg/m²   Estimated body mass index is 19.79 kg/m² as calculated from the following:    Height as of this encounter: 161.9 cm (63.75\").    Weight as of this encounter: 51.9 kg (114 lb 6.4 oz).       BMI is within normal parameters. No other follow-up for BMI required.      Physical Exam  Vitals reviewed.   Constitutional:       Appearance: She is well-developed.   HENT:      Head: Normocephalic and atraumatic.      Right Ear: Tympanic membrane and external ear normal.      Left Ear: Tympanic membrane and external ear normal.      Nose: Nose normal.   Eyes:      Conjunctiva/sclera: Conjunctivae normal.      Pupils: Pupils are equal, round, and reactive to light.   Neck:      Thyroid: No thyromegaly.      Vascular: No JVD.   Cardiovascular:      Rate and Rhythm: Normal rate and regular rhythm.      Heart sounds: Normal heart sounds.   Pulmonary:      Effort: Pulmonary effort is normal.      Breath sounds: Normal breath sounds.   Abdominal:      General: Bowel sounds are normal.      Palpations: Abdomen is soft.   Musculoskeletal:         General: Normal range of motion.      Cervical back: Normal range of motion and neck supple.   Lymphadenopathy: "      Cervical: No cervical adenopathy.   Skin:     General: Skin is warm and dry.      Findings: No rash.   Neurological:      Mental Status: She is alert and oriented to person, place, and time.      Cranial Nerves: No cranial nerve deficit.      Coordination: Coordination normal.   Psychiatric:         Behavior: Behavior normal.         Thought Content: Thought content normal.         Judgment: Judgment normal.        Result Review :                   Assessment and Plan   Diagnoses and all orders for this visit:    1. Mixed hyperlipidemia (Primary)  Comments:  Reviewed cardiovascular surveillance and wellness.  Orders:  -     Urinalysis With Microscopic If Indicated (No Culture) - Urine, Clean Catch  -     TSH  -     T4, Free  -     Lipid Panel With / Chol / HDL Ratio  -     CBC & Differential  -     Comprehensive Metabolic Panel  -     Hemoglobin A1c  -     Vitamin B12  -     Vitamin D,25-Hydroxy    2. Avitaminosis D  -     Vitamin D,25-Hydroxy    3. Systolic murmur    4. Medicare annual wellness visit, subsequent    5. Elevated glucose  -     Urinalysis With Microscopic If Indicated (No Culture) - Urine, Clean Catch  -     TSH  -     T4, Free  -     Lipid Panel With / Chol / HDL Ratio  -     CBC & Differential  -     Comprehensive Metabolic Panel  -     Hemoglobin A1c  -     Vitamin B12  -     Vitamin D,25-Hydroxy    6. B12 deficiency  -     Urinalysis With Microscopic If Indicated (No Culture) - Urine, Clean Catch  -     TSH  -     T4, Free  -     Lipid Panel With / Chol / HDL Ratio  -     CBC & Differential  -     Comprehensive Metabolic Panel  -     Hemoglobin A1c  -     Vitamin B12  -     Vitamin D,25-Hydroxy    7. Encounter for screening mammogram for malignant neoplasm of breast  -     Mammo Screening Digital Tomosynthesis Bilateral With CAD; Future             Follow Up   No follow-ups on file.  Patient was given instructions and counseling regarding her condition or for health maintenance advice. Please  see specific information pulled into the AVS if appropriate.

## 2023-11-09 NOTE — PROGRESS NOTES
"The ABCs of the Annual Wellness Visit  Subsequent Medicare Wellness Visit    Subjective      Kyaw Webb is a 73 y.o. female who presents for a Subsequent Medicare Wellness Visit.    The following portions of the patient's history were reviewed and   updated as appropriate: allergies, current medications, past family history, past medical history, past social history, past surgical history, and problem list.    Compared to one year ago, the patient feels her physical   health is the same.    Compared to one year ago, the patient feels her mental   health is the same.    Recent Hospitalizations:  She was not admitted to the hospital during the last year.       Current Medical Providers:  Patient Care Team:  Jhoan Burnett MD as PCP - General (Family Medicine)    No outpatient medications prior to visit.     No facility-administered medications prior to visit.       No opioid medication identified on active medication list. I have reviewed chart for other potential  high risk medication/s and harmful drug interactions in the elderly.        Aspirin is not on active medication list.  Aspirin use is not indicated based on review of current medical condition/s. Risk of harm outweighs potential benefits.  .    Patient Active Problem List   Diagnosis    Avitaminosis D    Hyperlipidemia     Advance Care Planning   Advance Care Planning     Advance Directive is not on file.  ACP discussion was held with the patient during this visit. Patient has an advance directive (not in EMR), copy requested.     Objective    Vitals:    11/09/23 1049   BP: 144/76   BP Location: Left arm   Patient Position: Sitting   Cuff Size: Adult   Pulse: 75   SpO2: 99%   Weight: 51.9 kg (114 lb 6.4 oz)   Height: 161.9 cm (63.75\")     Estimated body mass index is 19.79 kg/m² as calculated from the following:    Height as of this encounter: 161.9 cm (63.75\").    Weight as of this encounter: 51.9 kg (114 lb 6.4 oz).    BMI is within normal " parameters. No other follow-up for BMI required.      Does the patient have evidence of cognitive impairment?   No            HEALTH RISK ASSESSMENT    Smoking Status:  Social History     Tobacco Use   Smoking Status Never   Smokeless Tobacco Never     Alcohol Consumption:  Social History     Substance and Sexual Activity   Alcohol Use No     Fall Risk Screen:    ALONZO Fall Risk Assessment was completed, and patient is at LOW risk for falls.Assessment completed on:2023    Depression Screenin/9/2023    10:52 AM   PHQ-2/PHQ-9 Depression Screening   Little Interest or Pleasure in Doing Things 0-->not at all   Feeling Down, Depressed or Hopeless 0-->not at all   PHQ-9: Brief Depression Severity Measure Score 0       Health Habits and Functional and Cognitive Screenin/9/2023    10:49 AM   Functional & Cognitive Status   Do you have difficulty preparing food and eating? No   Do you have difficulty bathing yourself, getting dressed or grooming yourself? No   Do you have difficulty using the toilet? No   Do you have difficulty moving around from place to place? No   Do you have trouble with steps or getting out of a bed or a chair? No   Current Diet Well Balanced Diet   Dental Exam Up to date   Eye Exam Up to date   Exercise (times per week) 5 times per week   Current Exercises Include Other   Do you need help using the phone?  No   Are you deaf or do you have serious difficulty hearing?  No   Do you need help to go to places out of walking distance? No   Do you need help shopping? No   Do you need help preparing meals?  No   Do you need help with housework?  No   Do you need help with laundry? No   Do you need help taking your medications? No   Do you need help managing money? No   Do you ever drive or ride in a car without wearing a seat belt? No   Have you felt unusual stress, anger or loneliness in the last month? No   Who do you live with? Spouse   If you need help, do you have trouble finding  someone available to you? No   Have you been bothered in the last four weeks by sexual problems? No   Do you have difficulty concentrating, remembering or making decisions? No       Age-appropriate Screening Schedule:  Refer to the list below for future screening recommendations based on patient's age, sex and/or medical conditions. Orders for these recommended tests are listed in the plan section. The patient has been provided with a written plan.    Health Maintenance   Topic Date Due    DXA SCAN  Never done    TDAP/TD VACCINES (1 - Tdap) Never done    ZOSTER VACCINE (1 of 2) Never done    Pneumococcal Vaccine 65+ (1 - PCV) Never done    HEPATITIS C SCREENING  Never done    MAMMOGRAM  04/28/2023    INFLUENZA VACCINE  08/01/2023    COVID-19 Vaccine (3 - 2023-24 season) 09/01/2023    ANNUAL WELLNESS VISIT  11/02/2023    LIPID PANEL  11/02/2023    COLORECTAL CANCER SCREENING  09/11/2024                  CMS Preventative Services Quick Reference  Risk Factors Identified During Encounter:    None Identified    The above risks/problems have been discussed with the patient.  Pertinent information has been shared with the patient in the After Visit Summary.    There are no diagnoses linked to this encounter.    Follow Up:   Next Medicare Wellness visit to be scheduled in 1 year.      An After Visit Summary and PPPS were made available to the patient.

## 2023-11-09 NOTE — PATIENT INSTRUCTIONS
Medicare Wellness  Personal Prevention Plan of Service     Date of Office Visit:    Encounter Provider:  Jhoan Burentt MD  Place of Service:  [facility]  Patient Name: Kyaw Webb  :  1950    As part of the Medicare Wellness portion of your visit today, we are providing you with this personalized preventive plan of services (PPPS). This plan is based upon recommendations of the [place] Preventive Services Task Force (USPSTF) and the Advisory Committee on Immunization Practices (ACIP).    This lists the preventive care services that should be considered, and provides dates of when you are due. Items listed as completed are up-to-date and do not require any further intervention.    Health Maintenance   Topic Date Due    HEPATITIS C SCREENING  Never done    MAMMOGRAM  2023    ANNUAL WELLNESS VISIT  2023    LIPID PANEL  2023    DXA SCAN  2023 (Originally 1950)    Pneumococcal Vaccine 65+ (1 - PCV) 2024 (Originally 10/13/2014)    INFLUENZA VACCINE  2024 (Originally 2023)    ZOSTER VACCINE (1 of 2) 2024 (Originally 10/13/1999)    COVID-19 Vaccine (3 - -24 season) 2024 (Originally 2023)    TDAP/TD VACCINES (1 - Tdap) 2024 (Originally 10/13/1968)    COLORECTAL CANCER SCREENING  2024       Orders Placed This Encounter   Procedures    Urinalysis With Microscopic If Indicated (No Culture) - Urine, Clean Catch     Order Specific Question:   Release to patient     Answer:   Routine Release [[id number]]    TSH     Order Specific Question:   Release to patient     Answer:   Routine Release [[id number]]    T4, Free     Order Specific Question:   Release to patient     Answer:   Routine Release [[id number]]    Lipid Panel With / Chol / HDL Ratio     Order Specific Question:   Release to patient     Answer:   Routine Release [[id number]]    Comprehensive Metabolic Panel     Order Specific Question:    Release to patient     Answer:   Routine Release [[id number]]    Hemoglobin A1c     Order Specific Question:   Release to patient     Answer:   Routine Release [[id number]]    Vitamin B12     Order Specific Question:   Release to patient     Answer:   Routine Release [[id number]]    Vitamin D,25-Hydroxy     Order Specific Question:   Release to patient     Answer:   Routine Release [[id number]]    CBC & Differential     Order Specific Question:   Manual Differential     Answer:   No     Order Specific Question:   Release to patient     Answer:   Routine Release [[id number]]       No follow-ups on file.

## 2023-11-10 LAB
25(OH)D3+25(OH)D2 SERPL-MCNC: 30.2 NG/ML (ref 30–100)
ALBUMIN SERPL-MCNC: 4.5 G/DL (ref 3.8–4.8)
ALBUMIN/GLOB SERPL: 1.6 {RATIO} (ref 1.2–2.2)
ALP SERPL-CCNC: 86 IU/L (ref 44–121)
ALT SERPL-CCNC: 19 IU/L (ref 0–32)
APPEARANCE UR: CLEAR
AST SERPL-CCNC: 27 IU/L (ref 0–40)
BASOPHILS # BLD AUTO: 0 X10E3/UL (ref 0–0.2)
BASOPHILS NFR BLD AUTO: 1 %
BILIRUB SERPL-MCNC: 1.7 MG/DL (ref 0–1.2)
BILIRUB UR QL STRIP: NEGATIVE
BUN SERPL-MCNC: 18 MG/DL (ref 8–27)
BUN/CREAT SERPL: 22 (ref 12–28)
CALCIUM SERPL-MCNC: 9.5 MG/DL (ref 8.7–10.3)
CHLORIDE SERPL-SCNC: 103 MMOL/L (ref 96–106)
CHOLEST SERPL-MCNC: 237 MG/DL (ref 100–199)
CHOLEST/HDLC SERPL: 4 RATIO (ref 0–4.4)
CO2 SERPL-SCNC: 22 MMOL/L (ref 20–29)
COLOR UR: YELLOW
CREAT SERPL-MCNC: 0.82 MG/DL (ref 0.57–1)
EGFRCR SERPLBLD CKD-EPI 2021: 75 ML/MIN/1.73
EOSINOPHIL # BLD AUTO: 0 X10E3/UL (ref 0–0.4)
EOSINOPHIL NFR BLD AUTO: 1 %
ERYTHROCYTE [DISTWIDTH] IN BLOOD BY AUTOMATED COUNT: 11.8 % (ref 11.7–15.4)
GLOBULIN SER CALC-MCNC: 2.8 G/DL (ref 1.5–4.5)
GLUCOSE SERPL-MCNC: 97 MG/DL (ref 70–99)
GLUCOSE UR QL STRIP: NEGATIVE
HBA1C MFR BLD: 5.4 % (ref 4.8–5.6)
HCT VFR BLD AUTO: 40.7 % (ref 34–46.6)
HDLC SERPL-MCNC: 59 MG/DL
HGB BLD-MCNC: 13.7 G/DL (ref 11.1–15.9)
HGB UR QL STRIP: NEGATIVE
IMM GRANULOCYTES # BLD AUTO: 0 X10E3/UL (ref 0–0.1)
IMM GRANULOCYTES NFR BLD AUTO: 0 %
KETONES UR QL STRIP: ABNORMAL
LDLC SERPL CALC-MCNC: 147 MG/DL (ref 0–99)
LEUKOCYTE ESTERASE UR QL STRIP: NEGATIVE
LYMPHOCYTES # BLD AUTO: 1.1 X10E3/UL (ref 0.7–3.1)
LYMPHOCYTES NFR BLD AUTO: 26 %
MCH RBC QN AUTO: 31.9 PG (ref 26.6–33)
MCHC RBC AUTO-ENTMCNC: 33.7 G/DL (ref 31.5–35.7)
MCV RBC AUTO: 95 FL (ref 79–97)
MICRO URNS: ABNORMAL
MONOCYTES # BLD AUTO: 0.2 X10E3/UL (ref 0.1–0.9)
MONOCYTES NFR BLD AUTO: 6 %
NEUTROPHILS # BLD AUTO: 2.8 X10E3/UL (ref 1.4–7)
NEUTROPHILS NFR BLD AUTO: 66 %
NITRITE UR QL STRIP: NEGATIVE
PH UR STRIP: 6 [PH] (ref 5–7.5)
PLATELET # BLD AUTO: 194 X10E3/UL (ref 150–450)
POTASSIUM SERPL-SCNC: 4.4 MMOL/L (ref 3.5–5.2)
PROT SERPL-MCNC: 7.3 G/DL (ref 6–8.5)
PROT UR QL STRIP: NEGATIVE
RBC # BLD AUTO: 4.3 X10E6/UL (ref 3.77–5.28)
SODIUM SERPL-SCNC: 141 MMOL/L (ref 134–144)
SP GR UR STRIP: 1.01 (ref 1–1.03)
T4 FREE SERPL-MCNC: 1.16 NG/DL (ref 0.82–1.77)
TRIGL SERPL-MCNC: 174 MG/DL (ref 0–149)
TSH SERPL DL<=0.005 MIU/L-ACNC: 0.79 UIU/ML (ref 0.45–4.5)
UROBILINOGEN UR STRIP-MCNC: 0.2 MG/DL (ref 0.2–1)
VIT B12 SERPL-MCNC: 331 PG/ML (ref 232–1245)
VLDLC SERPL CALC-MCNC: 31 MG/DL (ref 5–40)
WBC # BLD AUTO: 4.2 X10E3/UL (ref 3.4–10.8)

## 2023-11-14 ENCOUNTER — HOSPITAL ENCOUNTER (OUTPATIENT)
Dept: MAMMOGRAPHY | Facility: HOSPITAL | Age: 73
Discharge: HOME | End: 2023-11-14
Admitting: FAMILY MEDICINE
Payer: MEDICARE

## 2023-11-14 DIAGNOSIS — Z12.31 ENCOUNTER FOR SCREENING MAMMOGRAM FOR MALIGNANT NEOPLASM OF BREAST: ICD-10-CM

## 2023-11-14 PROCEDURE — 77063 BREAST TOMOSYNTHESIS BI: CPT

## 2023-11-14 PROCEDURE — 77067 SCR MAMMO BI INCL CAD: CPT

## 2023-11-15 DIAGNOSIS — R92.8 ABNORMAL MAMMOGRAM OF RIGHT BREAST: Primary | ICD-10-CM

## 2023-11-17 ENCOUNTER — HOSPITAL ENCOUNTER (OUTPATIENT)
Dept: MAMMOGRAPHY | Facility: HOSPITAL | Age: 73
Discharge: HOME | End: 2023-11-17
Admitting: FAMILY MEDICINE
Payer: MEDICARE

## 2023-11-17 ENCOUNTER — TELEPHONE (OUTPATIENT)
Dept: MAMMOGRAPHY | Facility: HOSPITAL | Age: 73
End: 2023-11-17
Payer: MEDICARE

## 2023-11-17 ENCOUNTER — HOSPITAL ENCOUNTER (OUTPATIENT)
Dept: ULTRASOUND IMAGING | Facility: HOSPITAL | Age: 73
Discharge: HOME | End: 2023-11-17
Payer: MEDICARE

## 2023-11-17 ENCOUNTER — TELEPHONE (OUTPATIENT)
Dept: INTERNAL MEDICINE | Facility: CLINIC | Age: 73
End: 2023-11-17

## 2023-11-17 DIAGNOSIS — R92.8 ABNORMAL MAMMOGRAM OF RIGHT BREAST: ICD-10-CM

## 2023-11-17 DIAGNOSIS — R92.8 ABNORMAL ULTRASOUND OF BREAST: Primary | ICD-10-CM

## 2023-11-17 DIAGNOSIS — R92.8 ABNORMAL MAMMOGRAM OF RIGHT BREAST: Primary | ICD-10-CM

## 2023-11-17 PROCEDURE — 77065 DX MAMMO INCL CAD UNI: CPT

## 2023-11-17 PROCEDURE — G0279 TOMOSYNTHESIS, MAMMO: HCPCS

## 2023-11-17 PROCEDURE — 76642 ULTRASOUND BREAST LIMITED: CPT

## 2023-11-17 NOTE — TELEPHONE ENCOUNTER
Pre call complete regarding details of upcoming procedure scheduled for 11/21/23. Reviewed allergies and medications & arrival time (0930)/check in. Can eat and drink & take routine medications. Hold ASA 5 days & NSAIDs/Fish Oil/Vit E 3 days. Wears bra with loose top. No questions at this time. Patient and family appreciative of call.

## 2023-11-17 NOTE — TELEPHONE ENCOUNTER
Caller: Hernando Webb    Relationship: Emergency Contact    Best call back number: [phone]     What orders are you requesting (i.e. lab or imaging): BIOPSY OF RIGHT BREAST    In what timeframe would the patient need to come in: ASAP    Where will you receive your lab/imaging services: [facility]     Additional notes: REQUIRES ORDER FOR BIOPSY OF RIGHT BREAST FOLLOWING ADDITIONAL MAMMOGRAPHY.     PLEASE CALL TO DISCUSS AND ADVISE        Subjective


Date Seen


The patient was seen on 8/7/21.





Subjective


Chief Complaint/HPI


Patient seen and examined at bedside this morning. He was complaining of 

abdominal pain around his umbilicus. He denied headaches, chest pain, shortness 

of breath, nausea, vomiting, problems with urination and bowel movements. He 

denies feeling nauseous at this time.


Other systems


10 point review of system negative except for what is noted in the HPI.





Objective


Physical Examination


Other physical findings


General: Lying in bed, no acute distress


Head/Neck/Throat: Trachea midline, mucous membranes moist


Eyes: Sclera anicteric, PERRLA


Thorax: Normal respiratory effort on room air, lungs clear to auscultation 

bilaterally, no wheezes/rales/rhonchi


Cardiovascular: Normal rate, regular rhythm, normal S1, S2; no S3, S4, 

rubs/gallops/murmurs


Abdomen: Bowel sounds present, soft, mild tenderness reported with palpation


Genitourinary: No CVA tenderness, no Lainez in place


Musculoskeletal: Moving all extremities, no edema


Skin: Warm, dry


Neurologic: AAOx3, speech fluent and goal-directed, no focal deficits, grossly 

intact





Assessment /Plan


Assessment


#Rhabdomyolysis


-Endorses doing strenuous activity (works out twice a day for couple of hours), 

and drinking heavily on the weekend. Likely the reason for his rhabdomyolysis. 

Continue with IV fluids.


-Symptomatic management with pain medications.


-F/u on urine tox. screen.





#JACOB


-Likely secondary to underlying rhabdomyolysis - AIN/ATN. We will continue with 

IV fluids. Monitor urine output. Will obtain renal bladder ultrasound to rule 

out post renal obstruction.


-Avoid NSAIDs/nephrotoxic medications.





#Nicotine use


-Nicotine patch provided





#Dvt ppx 


-Heparin subq.





Plan/VTE


VTE Prophylaxis Ordered?:  Yes





VS, I&O, 24H, Fishbone


Vital Signs/I&O





Vital Signs








  Date Time  Temp Pulse Resp B/P (MAP) Pulse Ox O2 Delivery O2 Flow Rate FiO2


 


8/7/21 03:45 99.3 79 17 180/86 (117) 99 Room Air  














I&O- Last 24 Hours up to 6 AM 


 


 8/7/21





 05:59


 


Intake Total 3000 ml


 


Output Total 150 ml


 


Balance 2850 ml











Laboratory Data


24H LABS


Laboratory Tests 2


8/6/21 21:52: 


Immature Granulocyte % (Auto) 0.5, Neutrophils (%) (Auto) 80.9H, Lymphocytes (%)

(Auto) 8.4L, Monocytes (%) (Auto) 9.9H, Eosinophils (%) (Auto) 0.1, Basophils 

(%) (Auto) 0.2, Neutrophils # (Auto) 7.9, Lymphocytes # (Auto) 0.8L, Monocytes #

(Auto) 1.0H, Eosinophils # (Auto) 0.0, Basophils # (Auto) 0.0, Nucleated Red 

Blood Cells % (auto) 0.0, Total Bilirubin 0.5, Direct Bilirubin 0.1, Aspartate 

Amino Transf (AST/SGOT) 37, Alanine Aminotransferase (ALT/SGPT) 34, Alkaline 

Phosphatase 97, Total Creatine Kinase 360H, Total Protein 6.9, Albumin 3.9, 

Albumin/Globulin Ratio 1.3, Lipase 112, Monoscreen NEGATIVE


8/6/21 22:02: 


POC Glucose (Misc Panel) 103, POC Sodium (Misc Panel) 141, POC Potassium (Misc 

Panel) 3.6, POC Chloride (Misc Panel) 104, POC Total CO2 (Misc Panel) 23.0, POC 

Blood Urea Nitrogen (Misc Panel 28H, POC Ionized Calcium (Misc Panel) 4.9, POC 

Creatinine (Misc Panel) 2.9H, POC Hematocrit (Misc Panel) 44.0


8/7/21 00:28: 


Coronavirus (COVID-19)(PCR) NEGATIVE, Influenza Type A (RT-PCR) NEGATIVE, 

Influenza Type B (RT-PCR) NEGATIVE, Respiratory Syncytial Virus (PCR) NEGATIVE


8/7/21 01:53: 


Urine Color YELLOW, Urine Appearance HAZY, Urine pH 6.0, Urine Specific Gravity 

1.007, Urine Protein 3+H, Urine Glucose (UA) 1+H, Urine Ketones NEGATIVE, Urine 

Blood 1+H, Urine Nitrite NEGATIVE, Urine Bilirubin NEGATIVE, Urine Urobilinogen 

0.2, Urine Leukocyte Esterase 1+H, Urine WBC (Auto) 41H, Urine RBC (Auto) 6H, 

Urine Hyaline Casts (Auto) 1, Urine Bacteria (Auto) NEGATIVE, Urine Squamous 

Epithelial Cells 0, Urine Transitional Epithelial Cells <1, Urine Mucus (Auto) 

SMALL, Urine Sperm (Auto)


CBC/BMP


Laboratory Tests


8/6/21 21:52








Microbiology





Microbiology


8/7/21 Blood Culture, Received


         Pending


8/7/21 Blood Culture, Received


         Pending


8/7/21 Urine Culture, Received


         Pending











JASS SCHMITZ M.D.            Aug 7, 2021 07:06

## 2023-11-21 ENCOUNTER — TELEPHONE (OUTPATIENT)
Dept: SURGERY | Facility: CLINIC | Age: 73
End: 2023-11-21
Payer: MEDICARE

## 2023-11-21 ENCOUNTER — HOSPITAL ENCOUNTER (OUTPATIENT)
Dept: ULTRASOUND IMAGING | Facility: HOSPITAL | Age: 73
Discharge: HOME | End: 2023-11-21
Payer: MEDICARE

## 2023-11-21 ENCOUNTER — HOSPITAL ENCOUNTER (OUTPATIENT)
Dept: MAMMOGRAPHY | Facility: HOSPITAL | Age: 73
Discharge: HOME | End: 2023-11-21
Payer: MEDICARE

## 2023-11-21 VITALS
HEIGHT: 64 IN | DIASTOLIC BLOOD PRESSURE: 64 MMHG | HEART RATE: 74 BPM | TEMPERATURE: 97.7 F | OXYGEN SATURATION: 100 % | RESPIRATION RATE: 17 BRPM | WEIGHT: 114.4 LBS | BODY MASS INDEX: 19.53 KG/M2 | SYSTOLIC BLOOD PRESSURE: 143 MMHG

## 2023-11-21 DIAGNOSIS — R92.8 ABNORMAL ULTRASOUND OF BREAST: ICD-10-CM

## 2023-11-21 DIAGNOSIS — R92.8 ABNORMAL MAMMOGRAM OF RIGHT BREAST: ICD-10-CM

## 2023-11-21 PROCEDURE — 88360 TUMOR IMMUNOHISTOCHEM/MANUAL: CPT | Performed by: FAMILY MEDICINE

## 2023-11-21 PROCEDURE — 88305 TISSUE EXAM BY PATHOLOGIST: CPT | Performed by: FAMILY MEDICINE

## 2023-11-21 PROCEDURE — 88342 IMHCHEM/IMCYTCHM 1ST ANTB: CPT | Performed by: FAMILY MEDICINE

## 2023-11-21 PROCEDURE — 77065 DX MAMMO INCL CAD UNI: CPT

## 2023-11-21 PROCEDURE — A4648 IMPLANTABLE TISSUE MARKER: HCPCS

## 2023-11-21 PROCEDURE — 88341 IMHCHEM/IMCYTCHM EA ADD ANTB: CPT | Performed by: FAMILY MEDICINE

## 2023-11-21 PROCEDURE — 25010000002 LIDOCAINE 1 % SOLUTION: Performed by: RADIOLOGY

## 2023-11-21 RX ORDER — LIDOCAINE HYDROCHLORIDE 10 MG/ML
10 INJECTION, SOLUTION INFILTRATION; PERINEURAL ONCE
Status: COMPLETED | OUTPATIENT
Start: 2023-11-21 | End: 2023-11-21

## 2023-11-21 RX ORDER — LIDOCAINE HYDROCHLORIDE AND EPINEPHRINE 10; 10 MG/ML; UG/ML
10 INJECTION, SOLUTION INFILTRATION; PERINEURAL ONCE
Status: COMPLETED | OUTPATIENT
Start: 2023-11-21 | End: 2023-11-21

## 2023-11-21 RX ADMIN — LIDOCAINE HYDROCHLORIDE 3 ML: 10 INJECTION, SOLUTION INFILTRATION; PERINEURAL at 11:00

## 2023-11-21 RX ADMIN — LIDOCAINE HYDROCHLORIDE,EPINEPHRINE BITARTRATE 8 ML: 10; .01 INJECTION, SOLUTION INFILTRATION; PERINEURAL at 11:00

## 2023-11-21 NOTE — NURSING NOTE
Biopsy site to right outer breast clear with Exofin dry and intact. No firmness or swelling noted at or around biopsy site. Denies pain. Ice pack with protective covering applied to biopsy site. Discharge instructions discussed with understanding voiced by patient. Copies provided to patient. No distress noted. To home via personal vehicle with daughter.

## 2023-11-21 NOTE — H&P
Name: Kyaw Webb ADMIT: 2023   : 1950  PCP: Jhoan Burnett MD    MRN: [id number] LOS: 0 days   AGE/SEX: 73 y.o. female  ROOM: Room/bed info not found       Chief complaint R breast mass    Present Illness or Internal History:  Patient is a 73 y.o. female presents with R breast mass for US bx.     Past Surgical History:  Past Surgical History:   Procedure Laterality Date    COLONOSCOPY      HYSTERECTOMY      for cervical cancer    OOPHORECTOMY         Past Medical History:  Past Medical History:   Diagnosis Date    Avitaminosis D     Cervical cancer     about     Hyperlipidemia     Medicare annual wellness visit, subsequent 2023    Systolic murmur 2023       Home Medications:  (Not in a hospital admission)      Allergies:  Amoxicillin    Family History:  Family History   Problem Relation Age of Onset    Breast cancer Neg Hx        Social History:  Social History     Tobacco Use    Smoking status: Never    Smokeless tobacco: Never   Vaping Use    Vaping Use: Never used   Substance Use Topics    Alcohol use: No    Drug use: No        Objective     Physical Exam:    No exam performed today,    Vital Signs  Temp:  [97.7 °F (36.5 °C)] 97.7 °F (36.5 °C)  Heart Rate:  [81] 81  Resp:  [17] 17  BP: (146)/(82) 146/82    Anticipated Surgical Procedure:  Right breast ultrasound guided core needle biopsy    The risks, benefits and alternatives of this procedure have been discussed with the patient or responsible party: Yes        Dina Medrano MD  23  10:13 EST

## 2023-11-26 LAB
LAB AP CASE REPORT: NORMAL
LAB AP CLINICAL INFORMATION: NORMAL
LAB AP DIAGNOSIS COMMENT: NORMAL
LAB AP SPECIAL STAINS: NORMAL
LAB AP SYNOPTIC CHECKLIST: NORMAL
PATH REPORT.FINAL DX SPEC: NORMAL
PATH REPORT.GROSS SPEC: NORMAL

## 2023-11-27 ENCOUNTER — DOCUMENTATION (OUTPATIENT)
Dept: INTERNAL MEDICINE | Facility: CLINIC | Age: 73
End: 2023-11-27
Payer: MEDICARE

## 2023-11-27 NOTE — PROGRESS NOTES
Patient's  is notified of the pathology diagnosis with follow-up with Dr. Bev English breast oncology surgeon in [facility].

## 2023-11-27 NOTE — PROGRESS NOTES
I am releasing the results and will assist in any way to see Dr. Bev English at [facility].

## 2023-11-28 ENCOUNTER — TELEPHONE (OUTPATIENT)
Dept: SURGERY | Facility: CLINIC | Age: 73
End: 2023-11-28
Payer: MEDICARE

## 2023-11-28 DIAGNOSIS — C50.911 LOBULAR CARCINOMA OF RIGHT BREAST: Primary | ICD-10-CM

## 2023-11-28 NOTE — TELEPHONE ENCOUNTER
Called patient to schedule breast surgery consult. I spoke with patient's  who stated she will be seeing a different surgeon for her care.     I explained if anything changed they could call us and schedule/ SD

## 2023-12-06 LAB
LAB AP CASE REPORT: NORMAL
LAB AP CLINICAL INFORMATION: NORMAL
LAB AP DIAGNOSIS COMMENT: NORMAL
LAB AP SPECIAL STAINS: NORMAL
LAB AP SYNOPTIC CHECKLIST: NORMAL
PATH REPORT.ADDENDUM SPEC: NORMAL
PATH REPORT.FINAL DX SPEC: NORMAL
PATH REPORT.GROSS SPEC: NORMAL

## 2024-01-26 ENCOUNTER — DOCUMENTATION (OUTPATIENT)
Dept: INTERNAL MEDICINE | Facility: CLINIC | Age: 74
End: 2024-01-26
Payer: MEDICARE

## 2024-01-26 DIAGNOSIS — M81.0 AGE-RELATED OSTEOPOROSIS WITHOUT CURRENT PATHOLOGICAL FRACTURE: ICD-10-CM

## 2024-01-26 DIAGNOSIS — Z13.820 OSTEOPOROSIS SCREENING: Primary | ICD-10-CM

## 2024-01-26 NOTE — PROGRESS NOTES
Dexa scan is ordered as baseline in conjunction with work up and treatment of diagnosed breast cancer.

## 2024-02-09 ENCOUNTER — HOSPITAL ENCOUNTER (OUTPATIENT)
Dept: BONE DENSITY | Facility: HOSPITAL | Age: 74
Discharge: HOME | End: 2024-02-09
Payer: MEDICARE

## 2024-02-09 DIAGNOSIS — M81.0 AGE-RELATED OSTEOPOROSIS WITHOUT CURRENT PATHOLOGICAL FRACTURE: ICD-10-CM

## 2024-02-09 DIAGNOSIS — Z13.820 OSTEOPOROSIS SCREENING: ICD-10-CM

## 2024-02-09 PROCEDURE — 77080 DXA BONE DENSITY AXIAL: CPT

## 2025-03-14 ENCOUNTER — OFFICE VISIT (OUTPATIENT)
Dept: INTERNAL MEDICINE | Facility: CLINIC | Age: 75
End: 2025-03-14
Payer: MEDICARE

## 2025-03-14 VITALS
HEART RATE: 72 BPM | BODY MASS INDEX: 20.24 KG/M2 | DIASTOLIC BLOOD PRESSURE: 78 MMHG | SYSTOLIC BLOOD PRESSURE: 126 MMHG | OXYGEN SATURATION: 96 % | WEIGHT: 117 LBS

## 2025-03-14 DIAGNOSIS — Z12.11 COLON CANCER SCREENING: ICD-10-CM

## 2025-03-14 DIAGNOSIS — I35.1 NONRHEUMATIC AORTIC VALVE INSUFFICIENCY: ICD-10-CM

## 2025-03-14 DIAGNOSIS — E55.9 VITAMIN D DEFICIENCY: ICD-10-CM

## 2025-03-14 DIAGNOSIS — E78.00 ELEVATED CHOLESTEROL: ICD-10-CM

## 2025-03-14 DIAGNOSIS — Z12.11 ENCOUNTER FOR SCREENING COLONOSCOPY: ICD-10-CM

## 2025-03-14 DIAGNOSIS — R79.89 LOW VITAMIN D LEVEL: ICD-10-CM

## 2025-03-14 DIAGNOSIS — Z00.00 MEDICARE ANNUAL WELLNESS VISIT, SUBSEQUENT: Primary | ICD-10-CM

## 2025-03-14 DIAGNOSIS — C50.919 INVASIVE LOBULAR CARCINOMA OF BREAST IN FEMALE: ICD-10-CM

## 2025-03-14 DIAGNOSIS — I34.0 NONRHEUMATIC MITRAL VALVE REGURGITATION: ICD-10-CM

## 2025-03-14 DIAGNOSIS — E53.8 B12 DEFICIENCY: ICD-10-CM

## 2025-03-14 DIAGNOSIS — R73.09 ELEVATED GLUCOSE: ICD-10-CM

## 2025-03-14 RX ORDER — ANASTROZOLE 1 MG/1
1 TABLET ORAL
COMMUNITY
Start: 2024-12-04

## 2025-03-14 RX ORDER — IBANDRONATE SODIUM 150 MG
150 TABLET ORAL
COMMUNITY
Start: 2024-12-04

## 2025-03-14 NOTE — PROGRESS NOTES
Subjective   The ABCs of the Annual Wellness Visit  Medicare Wellness Visit      Kyaw Webb is a 74 y.o. patient who presents for a Medicare Wellness Visit.    The following portions of the patient's history were reviewed and   updated as appropriate: allergies, current medications, past family history, past medical history, past social history, past surgical history, and problem list.    Compared to one year ago, the patient's physical   health is the same.  Compared to one year ago, the patient's mental   health is the same.    Recent Hospitalizations:  She was not admitted to the hospital during the last year.     Current Medical Providers:  Patient Care Team:  Jhoan Burnett MD as PCP - General (Family Medicine)    Outpatient Medications Prior to Visit   Medication Sig Dispense Refill    Arimidex 1 MG tablet Take 1 tablet by mouth.      Boniva 150 MG tablet Take 1 tablet by mouth.       No facility-administered medications prior to visit.     No opioid medication identified on active medication list. I have reviewed chart for other potential  high risk medication/s and harmful drug interactions in the elderly.      Aspirin is not on active medication list.  Aspirin use is not indicated based on review of current medical condition/s. Risk of harm outweighs potential benefits.  .    Patient Active Problem List   Diagnosis    Avitaminosis D    Hyperlipidemia    Systolic murmur    B12 deficiency    Medicare annual wellness visit, subsequent    Elevated glucose     Advance Care Planning Advance Directive is not on file.  ACP discussion was held with the patient during this visit. Patient does not have an advance directive, information provided.            Objective   Vitals:    03/14/25 1050   BP: 126/78   BP Location: Left arm   Patient Position: Sitting   Cuff Size: Adult   Pulse: 72   SpO2: 96%   Weight: 53.1 kg (117 lb)   PainSc: 0-No pain       Estimated body mass index is 20.24 kg/m² as calculated  "from the following:    Height as of 11/21/23: 161.9 cm (63.75\").    Weight as of this encounter: 53.1 kg (117 lb).    BMI is within normal parameters. No other follow-up for BMI required.           Does the patient have evidence of cognitive impairment? No                                                                                                Health  Risk Assessment    Smoking Status:  Social History     Tobacco Use   Smoking Status Never   Smokeless Tobacco Never     Alcohol Consumption:  Social History     Substance and Sexual Activity   Alcohol Use No       Fall Risk Screen  STEADI Fall Risk Assessment was completed, and patient is at LOW risk for falls.Assessment completed on:3/14/2025    Depression Screening   Little interest or pleasure in doing things? Not at all   Feeling down, depressed, or hopeless? Not at all   PHQ-2 Total Score 0      Health Habits and Functional and Cognitive Screening:      3/13/2025     3:31 PM   Functional & Cognitive Status   Do you have difficulty preparing food and eating? No   Do you have difficulty bathing yourself, getting dressed or grooming yourself? No   Do you have difficulty using the toilet? No   Do you have difficulty moving around from place to place? No   Do you have trouble with steps or getting out of a bed or a chair? No   Current Diet Well Balanced Diet   Dental Exam Up to date   Eye Exam Up to date   Exercise (times per week) 7 times per week   Current Exercises Include Gardening;Walking   Do you need help using the phone?  No   Are you deaf or do you have serious difficulty hearing?  No   Do you need help to go to places out of walking distance? No   Do you need help shopping? No   Do you need help preparing meals?  No   Do you need help with housework?  No   Do you need help with laundry? No   Do you need help taking your medications? No   Do you need help managing money? No   Do you ever drive or ride in a car without wearing a seat belt? No   Have you " felt unusual stress, anger or loneliness in the last month? No   Who do you live with? Spouse   If you need help, do you have trouble finding someone available to you? No   Have you been bothered in the last four weeks by sexual problems? No   Do you have difficulty concentrating, remembering or making decisions? No           Age-appropriate Screening Schedule:  Refer to the list below for future screening recommendations based on patient's age, sex and/or medical conditions. Orders for these recommended tests are listed in the plan section. The patient has been provided with a written plan.    Health Maintenance List  Health Maintenance   Topic Date Due    TDAP/TD VACCINES (1 - Tdap) Never done    Pneumococcal Vaccine 50+ (1 of 1 - PCV) Never done    ZOSTER VACCINE (1 of 2) Never done    HEPATITIS C SCREENING  Never done    INFLUENZA VACCINE  07/01/2024    COVID-19 Vaccine (3 - 2024-25 season) 09/01/2024    COLORECTAL CANCER SCREENING  09/11/2024    RSV Vaccine - Adults (1 - 1-dose 75+ series) Never done    ANNUAL WELLNESS VISIT  11/09/2024    LIPID PANEL  11/09/2024    DXA SCAN  02/09/2026    MAMMOGRAM  Discontinued                                                                                                                                                CMS Preventative Services Quick Reference  Risk Factors Identified During Encounter  None Identified    The above risks/problems have been discussed with the patient.  Pertinent information has been shared with the patient in the After Visit Summary.  An After Visit Summary and PPPS were made available to the patient.    Follow Up:   Next Medicare Wellness visit to be scheduled in 1 year.         Additional E&M Note during same encounter follows:  Patient has additional, significant, and separately identifiable condition(s)/problem(s) that require work above and beyond the Medicare Wellness Visit     Chief Complaint  Medicare Wellness-subsequent    Subjective     MERE Lopez is also being seen today for an annual adult preventative physical exam.  and Kyaw Lopez is also being seen today for additional medical problem/s.       The patient is a 74-year-old female who presents for evaluation of breast cancer, osteoporosis, and elevated cholesterol.    She underwent a surgical procedure in [place], where she continues to receive follow-up care. She is currently on Arimidex as part of her treatment regimen. She has been advised to undergo metabolic testing annually. A mammogram was recently performed at . She reports no history of falls. She does not have a living will in place.    She is also taking Boniva and a half dose of calcium supplements for osteoporosis.    She expresses concern about her dietary habits, particularly her consumption of rice, chicken, beef, cod, walnuts, almonds, peanuts, bananas, apples, oatmeal, muffins, chocolate, tea, and coffee. She is seeking advice on whether she should modify her diet.    MEDICATIONS  Arimidex, Boniva, calcium          Objective   Vital Signs:  /78 (BP Location: Left arm, Patient Position: Sitting, Cuff Size: Adult)   Pulse 72   Wt 53.1 kg (117 lb)   SpO2 96%   BMI 20.24 kg/m²   Physical Exam  Vitals reviewed.   Constitutional:       Appearance: She is well-developed.   HENT:      Head: Normocephalic and atraumatic.      Right Ear: Tympanic membrane and external ear normal.      Left Ear: Tympanic membrane and external ear normal.      Nose: Nose normal.   Eyes:      Conjunctiva/sclera: Conjunctivae normal.      Pupils: Pupils are equal, round, and reactive to light.   Neck:      Thyroid: No thyromegaly.      Vascular: No JVD.   Cardiovascular:      Rate and Rhythm: Normal rate and regular rhythm.      Heart sounds: Normal heart sounds.       with a grade of 2/6.   Pulmonary:      Effort: Pulmonary effort is normal.      Breath sounds: Normal breath sounds.   Abdominal:      General: Bowel sounds are normal.       Palpations: Abdomen is soft.   Musculoskeletal:         General: Normal range of motion.      Cervical back: Normal range of motion and neck supple.   Lymphadenopathy:      Cervical: No cervical adenopathy.   Skin:     General: Skin is warm and dry.      Findings: No rash.   Neurological:      Mental Status: She is alert and oriented to person, place, and time.      Cranial Nerves: No cranial nerve deficit.      Coordination: Coordination normal.   Psychiatric:         Behavior: Behavior normal.         Thought Content: Thought content normal.         Judgment: Judgment normal.           Heart examination revealed mild aortic regurgitation and mild mitral regurgitation.    The following data was reviewed by: Jhoan Burnett MD on 03/14/2025:  Data reviewed : Consultant notes reviewed records of breast cancer surgery [facility] Dr. Bev English      Results  Laboratory Studies  Tissue pathology: Invasive lobular carcinoma, focal negative for lymphovascular space invasion. HER2 negative, ER positive, IL positive. Recurrence score 14.    Imaging  Echocardiogram from 2019 showed mild aortic valve thickening, mild aortic valve regurgitation, and mild mitral regurgitation.           Assessment and Plan Additional age appropriate preventative wellness advice topics were discussed during today's preventative wellness exam(some topics already addressed during AWV portion of the note above):    Physical Activity: Advised cardiovascular activity 150 minutes per week as tolerated. (example brisk walk for 30 minutes, 5 days a week).     Nutrition: Discussed nutrition plan with patient. Information shared in after visit summary. Goal is for a well balanced diet to enhance overall health.       1. Invasive lobular carcinoma.  She is currently on Arimidex, which is appropriate for her condition. Reviewed records of breast cancer surgery at [facility], Dr. Bev English. She had a right partial  mastectomy on December 21, 2023. HER2 negative, ER positive, FL positive, recurrence score 14.    2. Osteoporosis.  She is on Boniva and calcium supplements, which are appropriate for her condition. A vitamin D level will be checked to monitor her osteoporosis.    3. Elevated cholesterol.  Her cholesterol levels have shown a slight increase. Dietary recommendations include consuming unprocessed foods, rice, chicken, beef occasionally, fatty fish, walnuts, almonds, peanuts, fruits, and vegetables. Dark chocolate, tea, and coffee are acceptable in moderation. Basic labs including cholesterol, thyroid screen, CBC, metabolic panel, B12, and A1c will be ordered.    4. Colon cancer screening.  A Cologuard test will be ordered. The kit will be sent to her house within 10 business days, and instructions for use and return are included in the box.    5. Mild aortic regurgitation and mild mitral regurgitation.  An echocardiogram from 2019 showed mild aortic valve thickening, mild aortic valve regurgitation, and mild mitral regurgitation. The conditions were barely audible during the visit, and no repeat echocardiogram is necessary unless for curiosity.    PROCEDURE  The patient underwent a right partial mastectomy on 12/21/2023.                              1.  Medicare annual wellness visit, subsequent        ICD-10-CM: Z00.00ICD-9-CM: V70.0              2.  Nonrheumatic aortic valve insufficiency/mild        ICD-10-CM: I35.1ICD-9-CM: 424.1              3.  Nonrheumatic mitral valve regurgitation/mild        ICD-10-CM: I34.0ICD-9-CM: 424.0              4.  Invasive lobular carcinoma of breast in female/right        ICD-10-CM: C50.919ICD-9-CM: 174.9              5.  Elevated cholesterol        ICD-10-CM: E78.00ICD-9-CM: 272.0              6.  Colon cancer screening        ICD-10-CM: Z12.11ICD-9-CM: V76.51              7.  Encounter for screening colonoscopy        ICD-10-CM: Z12.11ICD-9-CM: V76.51              8.  Elevated  glucose        ICD-10-CM: R73.09ICD-9-CM: 790.29              9.  B12 deficiency        ICD-10-CM: E53.8ICD-9-CM: 266.2              10.  Low vitamin D level        ICD-10-CM: R79.89ICD-9-CM: 790.6            11.  Vitamin D deficiency        ICD-10-CM: E55.9ICD-9-C            Follow Up   No follow-ups on file.  Patient was given instructions and counseling regarding her condition or for health maintenance advice. Please see specific information pulled into the AVS if appropriate.  Patient or patient representative verbalized consent for the use of Ambient Listening during the visit with  Jhoan Burnett MD for chart documentation. 3/14/2025  11:18 EDT

## 2025-03-15 LAB
25(OH)D3+25(OH)D2 SERPL-MCNC: 56.1 NG/ML (ref 30–100)
ALBUMIN SERPL-MCNC: 4.6 G/DL (ref 3.5–5.2)
ALBUMIN/GLOB SERPL: 1.7 G/DL
ALP SERPL-CCNC: 73 U/L (ref 39–117)
ALT SERPL-CCNC: 21 U/L (ref 1–33)
APPEARANCE UR: CLEAR
AST SERPL-CCNC: 31 U/L (ref 1–32)
BASOPHILS # BLD AUTO: 0.03 10*3/MM3 (ref 0–0.2)
BASOPHILS NFR BLD AUTO: 0.7 % (ref 0–1.5)
BILIRUB SERPL-MCNC: 1.8 MG/DL (ref 0–1.2)
BILIRUB UR QL STRIP: NEGATIVE
BUN SERPL-MCNC: 16 MG/DL (ref 8–23)
BUN/CREAT SERPL: 17.8 (ref 7–25)
CALCIUM SERPL-MCNC: 9.7 MG/DL (ref 8.6–10.5)
CHLORIDE SERPL-SCNC: 101 MMOL/L (ref 98–107)
CHOLEST SERPL-MCNC: 211 MG/DL (ref 0–200)
CHOLEST/HDLC SERPL: 3.58 {RATIO}
CO2 SERPL-SCNC: 25.1 MMOL/L (ref 22–29)
COLOR UR: YELLOW
CREAT SERPL-MCNC: 0.9 MG/DL (ref 0.57–1)
EGFRCR SERPLBLD CKD-EPI 2021: 66.8 ML/MIN/1.73
EOSINOPHIL # BLD AUTO: 0.03 10*3/MM3 (ref 0–0.4)
EOSINOPHIL NFR BLD AUTO: 0.7 % (ref 0.3–6.2)
ERYTHROCYTE [DISTWIDTH] IN BLOOD BY AUTOMATED COUNT: 12 % (ref 12.3–15.4)
GLOBULIN SER CALC-MCNC: 2.7 GM/DL
GLUCOSE SERPL-MCNC: 89 MG/DL (ref 65–99)
GLUCOSE UR QL STRIP: NEGATIVE
HBA1C MFR BLD: 5.6 % (ref 4.8–5.6)
HCT VFR BLD AUTO: 43 % (ref 34–46.6)
HDLC SERPL-MCNC: 59 MG/DL (ref 40–60)
HGB BLD-MCNC: 14.3 G/DL (ref 12–15.9)
HGB UR QL STRIP: NEGATIVE
IMM GRANULOCYTES # BLD AUTO: 0.01 10*3/MM3 (ref 0–0.05)
IMM GRANULOCYTES NFR BLD AUTO: 0.2 % (ref 0–0.5)
KETONES UR QL STRIP: NEGATIVE
LDLC SERPL CALC-MCNC: 126 MG/DL (ref 0–100)
LEUKOCYTE ESTERASE UR QL STRIP: NEGATIVE
LYMPHOCYTES # BLD AUTO: 1.38 10*3/MM3 (ref 0.7–3.1)
LYMPHOCYTES NFR BLD AUTO: 31.1 % (ref 19.6–45.3)
MCH RBC QN AUTO: 32.1 PG (ref 26.6–33)
MCHC RBC AUTO-ENTMCNC: 33.3 G/DL (ref 31.5–35.7)
MCV RBC AUTO: 96.6 FL (ref 79–97)
MONOCYTES # BLD AUTO: 0.24 10*3/MM3 (ref 0.1–0.9)
MONOCYTES NFR BLD AUTO: 5.4 % (ref 5–12)
NEUTROPHILS # BLD AUTO: 2.75 10*3/MM3 (ref 1.7–7)
NEUTROPHILS NFR BLD AUTO: 61.9 % (ref 42.7–76)
NITRITE UR QL STRIP: NEGATIVE
NRBC BLD AUTO-RTO: 0 /100 WBC (ref 0–0.2)
PH UR STRIP: 7 [PH] (ref 5–8)
PLATELET # BLD AUTO: 184 10*3/MM3 (ref 140–450)
POTASSIUM SERPL-SCNC: 3.8 MMOL/L (ref 3.5–5.2)
PROT SERPL-MCNC: 7.3 G/DL (ref 6–8.5)
PROT UR QL STRIP: NEGATIVE
RBC # BLD AUTO: 4.45 10*6/MM3 (ref 3.77–5.28)
SODIUM SERPL-SCNC: 139 MMOL/L (ref 136–145)
SP GR UR STRIP: NORMAL (ref 1–1.03)
T4 FREE SERPL-MCNC: 1.3 NG/DL (ref 0.92–1.68)
TRIGL SERPL-MCNC: 145 MG/DL (ref 0–150)
TSH SERPL DL<=0.005 MIU/L-ACNC: 0.72 UIU/ML (ref 0.27–4.2)
UROBILINOGEN UR STRIP-MCNC: NORMAL MG/DL
VIT B12 SERPL-MCNC: 505 PG/ML (ref 211–946)
VLDLC SERPL CALC-MCNC: 26 MG/DL (ref 5–40)
WBC # BLD AUTO: 4.44 10*3/MM3 (ref 3.4–10.8)

## 2025-03-17 ENCOUNTER — RESULTS FOLLOW-UP (OUTPATIENT)
Dept: INTERNAL MEDICINE | Facility: CLINIC | Age: 75
End: 2025-03-17
Payer: MEDICARE

## 2025-03-17 NOTE — LETTER
Kyaw Webb  [address] [address]    March 17, 2025     Dear Ms. Webb:    Below are the results from your recent visit:    Resulted Orders   Urinalysis With Microscopic If Indicated (No Culture) - Urine, Clean Catch   Result Value Ref Range    Specific Gravity, UA Comment 1.005 - 1.030      Comment:      <=1.005    pH, UA 7.0 5.0 - 8.0    Color, UA Yellow       Comment:      Urine microscopic not indicated.  REFERENCE RANGE: Yellow, Straw      Appearance, UA Clear Clear    Leukocytes, UA Negative Negative    Protein Negative Negative    Glucose, UA Negative Negative    Ketones Negative Negative    Blood, UA Negative Negative    Bilirubin, UA Negative Negative    Urobilinogen, UA Comment       Comment:      0.2 E.U./dL  REFERENCE RANGE: 0.2 - 1.0 E.U./dL      Nitrite, UA Negative Negative   TSH   Result Value Ref Range    TSH 0.720 0.270 - 4.200 uIU/mL   T4, Free   Result Value Ref Range    Free T4 1.30 0.92 - 1.68 ng/dL   Lipid Panel With / Chol / HDL Ratio   Result Value Ref Range    Total Cholesterol 211 (H) 0 - 200 mg/dL      Comment:      Cholesterol Reference Ranges  (U.S. Department of Health and Human Services ATP III  Classifications)  Desirable          <200 mg/dL  Borderline High    200-239 mg/dL  High Risk          >240 mg/dL  Triglyceride Reference Ranges  (U.S. Department of Health and Human Services ATP III  Classifications)  Normal           <150 mg/dL  Borderline High  150-199 mg/dL  High             200-499 mg/dL  Very High        >500 mg/dL  HDL Reference Ranges  (U.S. Department of Health and Human Services ATP III  Classifications)  Low     <40 mg/dl (major risk factor for CHD)  High    >60 mg/dl ('negative' risk factor for CHD)  LDL Reference Ranges  (U.S. Department of Health and Human Services ATP III  Classifications)  Optimal          <100 mg/dL  Near Optimal     100-129 mg/dL  Borderline High  130-159 mg/dL  High             160-189 mg/dL  Very High         >189 mg/dL  LDL is calculated using the NIH LDL-C calculation.      Triglycerides 145 0 - 150 mg/dL    HDL Cholesterol 59 40 - 60 mg/dL    VLDL Cholesterol Sriram 26 5 - 40 mg/dL    LDL Chol Calc ([facility]) 126 (H) 0 - 100 mg/dL    Chol/HDL Ratio 3.58    CBC & Differential   Result Value Ref Range    WBC 4.44 3.40 - 10.80 10*3/mm3    RBC 4.45 3.77 - 5.28 10*6/mm3    Hemoglobin 14.3 12.0 - 15.9 g/dL    Hematocrit 43.0 34.0 - 46.6 %    MCV 96.6 79.0 - 97.0 fL    MCH 32.1 26.6 - 33.0 pg    MCHC 33.3 31.5 - 35.7 g/dL    RDW 12.0 (L) 12.3 - 15.4 %    Platelets 184 140 - 450 10*3/mm3    Neutrophil Rel % 61.9 42.7 - 76.0 %    Lymphocyte Rel % 31.1 19.6 - 45.3 %    Monocyte Rel % 5.4 5.0 - 12.0 %    Eosinophil Rel % 0.7 0.3 - 6.2 %    Basophil Rel % 0.7 0.0 - 1.5 %    Neutrophils Absolute 2.75 1.70 - 7.00 10*3/mm3    Lymphocytes Absolute 1.38 0.70 - 3.10 10*3/mm3    Monocytes Absolute 0.24 0.10 - 0.90 10*3/mm3    Eosinophils Absolute 0.03 0.00 - 0.40 10*3/mm3    Basophils Absolute 0.03 0.00 - 0.20 10*3/mm3    Immature Granulocyte Rel % 0.2 0.0 - 0.5 %    Immature Grans Absolute 0.01 0.00 - 0.05 10*3/mm3    nRBC 0.0 0.0 - 0.2 /100 WBC   Comprehensive Metabolic Panel   Result Value Ref Range    Glucose 89 65 - 99 mg/dL    BUN 16 8 - 23 mg/dL    Creatinine 0.90 0.57 - 1.00 mg/dL    EGFR Result 66.8 >60.0 mL/min/1.73      Comment:      GFR Categories in Chronic Kidney Disease (CKD)/X09/  /X09/  GFR Category          GFR (mL/min/1.73)    Interpretation  G1/X09/                    90 or greater/X09/        Normal or high  (1)  G2//                    60-89                Mild decrease  (1)  G3a                   45-59                Mild to moderate  decrease  G3b                   30-44                Moderate to  severe decrease  G4                    15-29                Severe decrease  G5                    14 or less           Kidney failure//  /[id number]/  (1)In the absence of evidence of kidney disease, neither  GFR  category G1 or G2 fulfill the criteria for CKD.  eGFR calculation 2021 CKD-EPI creatinine equation, which  does not include race as a factor      BUN/Creatinine Ratio 17.8 7.0 - 25.0    Sodium 139 136 - 145 mmol/L    Potassium 3.8 3.5 - 5.2 mmol/L    Chloride 101 98 - 107 mmol/L    Total CO2 25.1 22.0 - 29.0 mmol/L    Calcium 9.7 8.6 - 10.5 mg/dL    Total Protein 7.3 6.0 - 8.5 g/dL    Albumin 4.6 3.5 - 5.2 g/dL    Globulin 2.7 gm/dL    A/G Ratio 1.7 g/dL    Total Bilirubin 1.8 (H) 0.0 - 1.2 mg/dL    Alkaline Phosphatase 73 39 - 117 U/L    AST (SGOT) 31 1 - 32 U/L    ALT (SGPT) 21 1 - 33 U/L   Hemoglobin A1c   Result Value Ref Range    Hemoglobin A1C 5.60 4.80 - 5.60 %      Comment:      Hemoglobin A1C Ranges:  Increased Risk for Diabetes  5.7% to 6.4%  Diabetes                     >= 6.5%  Diabetic Goal                < 7.0%     Vitamin B12   Result Value Ref Range    Vitamin B-12 505 211 - 946 pg/mL      Comment:      Results may be falsely increased if patient taking Biotin.   Vitamin D,25-Hydroxy   Result Value Ref Range    25 Hydroxy, Vitamin D 56.1 30.0 - 100.0 ng/ml      Comment:      Reference Range for Total Vitamin D 25(OH)  Deficiency <20.0 ng/mL  Insufficiency 21-29 ng/mL  Sufficiency  ng/mL  Toxicity >100 ng/ml         The test results show that your labs are remarkably stable aside from a slightly elevated cholesterol.  Elevated bilirubin is chronic and inherited and does not pose a problem as far as I can tell.    If you have any questions or concerns, please don't hesitate to call.         Sincerely,        Jhoan Burnett MD       details… detailed exam ear L/nose/pharynx/mouth

## 2025-03-17 NOTE — PROGRESS NOTES
Your labs look remarkably stable except for slight elevation of cholesterol.  Elevated bilirubin is from an inherited metabolic pathway for bilirubin that is not hurting you in my opinion.

## 2025-03-31 DIAGNOSIS — R19.5 POSITIVE COLORECTAL CANCER SCREENING USING COLOGUARD TEST: Primary | ICD-10-CM

## 2025-04-02 DIAGNOSIS — R19.5 POSITIVE COLORECTAL CANCER SCREENING USING COLOGUARD TEST: Primary | ICD-10-CM

## 2025-04-02 DIAGNOSIS — Z12.11 ENCOUNTER FOR SCREENING COLONOSCOPY: ICD-10-CM

## 2025-04-11 ENCOUNTER — DOCUMENTATION (OUTPATIENT)
Dept: INTERNAL MEDICINE | Facility: CLINIC | Age: 75
End: 2025-04-11
Payer: MEDICARE

## 2025-04-11 NOTE — PROGRESS NOTES
Interface, See Report  Physician     Op Note  Signed     Date of Service: 09/11/14 0815  Creation Time: 09/20/15 1718     Signed       [facility]  Medical Record Number: [id number]  Visit Number:  [id number]  YOB: 1950  Dictating Surgeon:  Petros Gonzales M.D.*  Primary Care Physician:  DAWN PALMER  Date of Operation:  09/11/2014  ________________________________________________________________________     PREOPERATIVE DIAGNOSIS: Screening.      POSTOPERATIVE DIAGNOSIS: Screening.      PROCEDURE PERFORMED: Colonoscopy to the cecum with hot snare polypectomy x1.      SURGEON: Petros Gonzales M.D.      ANESTHESIA: MAC.      COMPLICATIONS: None.      FINDINGS:   1. The patient had a sessile polyp at 20 cm in the lower sigmoid which was  removed with a hot snare technique and sent as specimen.   2. The patient in the upper sigmoid had some submucosal venous lakes. There was  no evidence of ulceration or bleeding. These were photographed, otherwise,  normal colonoscopy to the cecum.      INDICATIONS: The patient is a 63-year-old patient of Dr. Jauregui referred for  screening.      DESCRIPTION OF PROCEDURE: The patient was brought to the operating suite, cristal  on the table in the supine position, rolled to the left lateral decubitus  position, and administered the above sedation throughout the exam. A rectal  exam was performed which was normal. The scope was inserted blindly into the  rectum. Air was insufflated. It was passed under direct vision to the rectum  and into the lower sigmoid. At that point, the patient had a sessile,  approximately 8 mm polyp. It was looped with a colonoscopy snare, elevated away  from the wall, transected, and retrieved by suction. Hemostasis was excellent.  We continued to advance the scope just above this level and extending over  10-15 cm, the patient had multiple submucosal venous lakes. There was no  evidence of ulceration  or bleeding. There were no diverticula. We continued to  advance the scope on up through the remaining sigmoid, descending colon, across  the transverse colon, and down the ascending colon to the cecum. The cecum was  identified by the presence of the appendiceal orifice and ileocecal valve.  Photographs were obtained. The scope was then withdrawn. The mucosa was  carefully circumferentially visualized. There were no additional mucosal  abnormalities seen. As the scope was slowly withdrawn back down to the rectum,  it was retroflexed. The lower rectum and anus were visualized. These were  normal. The scope was then returned to its normal position. The air was  aspirated. The procedure was terminated. The patient tolerated it well.          Petros Gonzales M.D.*  DARA:tk  D:   09/11/2014 10:14:28  T:   09/11/2014 22:14:54  Job ID:   [id number]  Document ID:   [id number]  Rev:   0  cc:  Darcy Jauregui M.D.*      DO NOT TEXT EDIT THIS LINE :[facility]:[id number]:  Authenticated by DR. PETROS GONZALES On 09/18/2014 08:42:25 AM

## 2025-04-11 NOTE — PROGRESS NOTES
Signed       PREAT -   -[facility]     [place]   Medical Record Number: [id number]  Visit Number:  [id number]  YOB: 1950  Dictating Surgeon:  Petros Gonzales M.D.*  Primary Care Physician:  DAWN PALMER  Date of Operation:  09/11/2014  ________________________________________________________________________     PREOPERATIVE DIAGNOSIS: Screening.      POSTOPERATIVE DIAGNOSIS: Screening.      PROCEDURE PERFORMED: Colonoscopy to the cecum with hot snare polypectomy x1.      SURGEON: Petros Gonzales M.D.      ANESTHESIA: MAC.      COMPLICATIONS: None.      FINDINGS:   1. The patient had a sessile polyp at 20 cm in the lower sigmoid which was  removed with a hot snare technique and sent as specimen.   2. The patient in the upper sigmoid had some submucosal venous lakes. There was  no evidence of ulceration or bleeding. These were photographed, otherwise,  normal colonoscopy to the cecum.      INDICATIONS: The patient is a 63-year-old patient of Dr. Jauregui referred for  screening.      DESCRIPTION OF PROCEDURE: The patient was brought to the operating suite, cristal  on the table in the supine position, rolled to the left lateral decubitus  position, and administered the above sedation throughout the exam. A rectal  exam was performed which was normal. The scope was inserted blindly into the  rectum. Air was insufflated. It was passed under direct vision to the rectum  and into the lower sigmoid. At that point, the patient had a sessile,  approximately 8 mm polyp. It was looped with a colonoscopy snare, elevated away  from the wall, transected, and retrieved by suction. Hemostasis was excellent.  We continued to advance the scope just above this level and extending over  10-15 cm, the patient had multiple submucosal venous lakes. There was no  evidence of ulceration or bleeding. There were no diverticula. We continued to  advance the scope on up through the remaining sigmoid,  descending colon, across  the transverse colon, and down the ascending colon to the cecum. The cecum was  identified by the presence of the appendiceal orifice and ileocecal valve.  Photographs were obtained. The scope was then withdrawn. The mucosa was  carefully circumferentially visualized. There were no additional mucosal  abnormalities seen. As the scope was slowly withdrawn back down to the rectum,  it was retroflexed. The lower rectum and anus were visualized. These were  normal. The scope was then returned to its normal position. The air was  aspirated. The procedure was terminated. The patient tolerated it well.          Petros Gonzales M.D.*  DARA:jordan  D:   09/11/2014 10:14:28  T:   09/11/2014 22:14:54  Job ID:   [id number]  Document ID:   [id number]  Rev:   0  cc:  Darcy Jauregui M.D.*      DO NOT TEXT EDIT THIS LINE :[id number]:  Authenticated by DR. SWEENEY

## 2025-04-11 NOTE — PROGRESS NOTES
"Converted Surgical Pathology [[id number]] (Order [id number])  Order  Date: 2015 Department: [facility] PREADMISSION T Released By:  (auto-released) Authorizing: Interface, See Report     Reprint Order Requisition    Converted Surgical Pathology (Order #[id number]) on 14        Converted Surgical Pathology  Order: [id number]   Status: Final result       Next appt: 2026 at 09:00 AM in Internal Medicine (Jhoan Burnett MD)    Test Result Released: Yes (seen)    0 Result Notes        Narrative  Performed by:  [facility]    [facility]  [address] [place] [address]  SURGICAL PATHOLOGY REPORT    Patient Name: NICOL DEMPSEY  MR#: [id number]  : 1950  Gender: F  Ordering Physician: RAH ARZOLA, JEANMARIE GARCIA  Copy To: RAH SEYMOUR, WILFRIDO SANCHEZ  Accession #: [id number]  Location: PREADMISSION TESTING  Encounter #: [id number]  Collected: 2014  Received: 2014  Reported: 2014    Clinical Diagnosis and History  SCREENING    OPER: C SCOPE      Diagnosis  SIGMOID POLYP, 20 CM:       HYPERPLASTIC POLYP.    CMK/rika  [facility]/MAXIMO      Electronically Signed Out By  RAH SAHU CHRISTOPHER    Specimen(s) Received  1: SIGMOID POLYP  @20 CM rd        Gross Description  Received in formalin designated \"sigmoid polyp at 20 cm\" are fragments of tan  tissue 0.3 x 0.2 x 0.2 cm overall. This possibly represents a single tissue  fragment. Submitted labeled 1A.  MAXIMO/ap      Microscopic Description  Performed, incorporated in diagnosis.   Specimen Collected: 14  8:15 AM EDT Last Resulted: 14 11:55 AM EDT              "